# Patient Record
Sex: FEMALE | Race: WHITE | NOT HISPANIC OR LATINO | URBAN - METROPOLITAN AREA
[De-identification: names, ages, dates, MRNs, and addresses within clinical notes are randomized per-mention and may not be internally consistent; named-entity substitution may affect disease eponyms.]

---

## 2018-07-30 ENCOUNTER — ON CAMPUS - OUTPATIENT (AMBULATORY)
Dept: URBAN - METROPOLITAN AREA HOSPITAL 2 | Facility: HOSPITAL | Age: 78
End: 2018-07-30
Payer: MEDICARE

## 2018-07-30 VITALS
DIASTOLIC BLOOD PRESSURE: 70 MMHG | DIASTOLIC BLOOD PRESSURE: 68 MMHG | RESPIRATION RATE: 16 BRPM | WEIGHT: 168 LBS | OXYGEN SATURATION: 95 % | HEART RATE: 66 BPM | SYSTOLIC BLOOD PRESSURE: 152 MMHG | HEART RATE: 72 BPM | SYSTOLIC BLOOD PRESSURE: 118 MMHG | DIASTOLIC BLOOD PRESSURE: 91 MMHG | DIASTOLIC BLOOD PRESSURE: 64 MMHG | HEART RATE: 75 BPM | DIASTOLIC BLOOD PRESSURE: 106 MMHG | TEMPERATURE: 97.8 F | SYSTOLIC BLOOD PRESSURE: 117 MMHG | SYSTOLIC BLOOD PRESSURE: 158 MMHG | HEART RATE: 60 BPM | HEART RATE: 63 BPM | DIASTOLIC BLOOD PRESSURE: 72 MMHG | HEIGHT: 63 IN | OXYGEN SATURATION: 98 % | RESPIRATION RATE: 15 BRPM | SYSTOLIC BLOOD PRESSURE: 137 MMHG | OXYGEN SATURATION: 97 % | SYSTOLIC BLOOD PRESSURE: 115 MMHG | SYSTOLIC BLOOD PRESSURE: 130 MMHG | DIASTOLIC BLOOD PRESSURE: 74 MMHG | SYSTOLIC BLOOD PRESSURE: 161 MMHG | HEART RATE: 64 BPM | SYSTOLIC BLOOD PRESSURE: 129 MMHG | HEART RATE: 65 BPM | OXYGEN SATURATION: 96 % | DIASTOLIC BLOOD PRESSURE: 58 MMHG

## 2018-07-30 DIAGNOSIS — Z98.890 OTHER SPECIFIED POSTPROCEDURAL STATES: ICD-10-CM

## 2018-07-30 DIAGNOSIS — K57.30 DIVERTICULOSIS OF LARGE INTESTINE WITHOUT PERFORATION OR ABS: ICD-10-CM

## 2018-07-30 DIAGNOSIS — Z86.010 PERSONAL HISTORY OF COLONIC POLYPS: ICD-10-CM

## 2018-07-30 DIAGNOSIS — Z85.038 PERSONAL HISTORY OF OTHER MALIGNANT NEOPLASM OF LARGE INTEST: ICD-10-CM

## 2018-07-30 PROCEDURE — G0105 COLORECTAL SCRN; HI RISK IND: HCPCS | Performed by: INTERNAL MEDICINE

## 2018-07-30 RX ADMIN — PROPOFOL: 10 INJECTION, EMULSION INTRAVENOUS at 07:24

## 2023-10-10 ENCOUNTER — LAB (OUTPATIENT)
Dept: LAB | Facility: HOSPITAL | Age: 83
End: 2023-10-10
Payer: MEDICARE

## 2023-10-10 ENCOUNTER — TRANSCRIBE ORDERS (OUTPATIENT)
Dept: ADMINISTRATIVE | Facility: HOSPITAL | Age: 83
End: 2023-10-10
Payer: MEDICARE

## 2023-10-10 DIAGNOSIS — L29.8 PRURITUS SENILIS: ICD-10-CM

## 2023-10-10 DIAGNOSIS — L50.1 IDIOPATHIC URTICARIA: ICD-10-CM

## 2023-10-10 DIAGNOSIS — L30.9 ACUTE DERMATITIS: Primary | ICD-10-CM

## 2023-10-10 DIAGNOSIS — T78.3XXA GIANT URTICARIA, INITIAL ENCOUNTER: ICD-10-CM

## 2023-10-10 DIAGNOSIS — L30.9 ACUTE DERMATITIS: ICD-10-CM

## 2023-10-10 DIAGNOSIS — L24.0 IRRITANT CONTACT DERMATITIS DUE TO DETERGENT: ICD-10-CM

## 2023-10-10 LAB
ALBUMIN SERPL-MCNC: 4.1 G/DL (ref 3.5–5.2)
ALBUMIN/GLOB SERPL: 1.4 G/DL
ALP SERPL-CCNC: 83 U/L (ref 39–117)
ALT SERPL W P-5'-P-CCNC: 11 U/L (ref 1–33)
ANION GAP SERPL CALCULATED.3IONS-SCNC: 11.3 MMOL/L (ref 5–15)
AST SERPL-CCNC: 15 U/L (ref 1–32)
BASOPHILS # BLD AUTO: 0.09 10*3/MM3 (ref 0–0.2)
BASOPHILS NFR BLD AUTO: 0.9 % (ref 0–1.5)
BILIRUB SERPL-MCNC: 1 MG/DL (ref 0–1.2)
BUN SERPL-MCNC: 16 MG/DL (ref 8–23)
BUN/CREAT SERPL: 16.3 (ref 7–25)
C3 SERPL-MCNC: 131 MG/DL (ref 82–167)
C4 SERPL-MCNC: 22 MG/DL (ref 14–44)
CALCIUM SPEC-SCNC: 9.6 MG/DL (ref 8.6–10.5)
CHLORIDE SERPL-SCNC: 106 MMOL/L (ref 98–107)
CO2 SERPL-SCNC: 25.7 MMOL/L (ref 22–29)
CREAT SERPL-MCNC: 0.98 MG/DL (ref 0.57–1)
DEPRECATED RDW RBC AUTO: 39.1 FL (ref 37–54)
EGFRCR SERPLBLD CKD-EPI 2021: 57 ML/MIN/1.73
EOSINOPHIL # BLD AUTO: 0.1 10*3/MM3 (ref 0–0.4)
EOSINOPHIL NFR BLD AUTO: 1.1 % (ref 0.3–6.2)
ERYTHROCYTE [DISTWIDTH] IN BLOOD BY AUTOMATED COUNT: 12.5 % (ref 12.3–15.4)
GLOBULIN UR ELPH-MCNC: 3 GM/DL
GLUCOSE SERPL-MCNC: 93 MG/DL (ref 65–99)
HCT VFR BLD AUTO: 42.7 % (ref 34–46.6)
HGB BLD-MCNC: 14.4 G/DL (ref 12–15.9)
IMM GRANULOCYTES # BLD AUTO: 0.03 10*3/MM3 (ref 0–0.05)
IMM GRANULOCYTES NFR BLD AUTO: 0.3 % (ref 0–0.5)
LYMPHOCYTES # BLD AUTO: 2.15 10*3/MM3 (ref 0.7–3.1)
LYMPHOCYTES NFR BLD AUTO: 22.7 % (ref 19.6–45.3)
MCH RBC QN AUTO: 29.1 PG (ref 26.6–33)
MCHC RBC AUTO-ENTMCNC: 33.7 G/DL (ref 31.5–35.7)
MCV RBC AUTO: 86.4 FL (ref 79–97)
MONOCYTES # BLD AUTO: 0.76 10*3/MM3 (ref 0.1–0.9)
MONOCYTES NFR BLD AUTO: 8 % (ref 5–12)
NEUTROPHILS NFR BLD AUTO: 6.36 10*3/MM3 (ref 1.7–7)
NEUTROPHILS NFR BLD AUTO: 67 % (ref 42.7–76)
NRBC BLD AUTO-RTO: 0 /100 WBC (ref 0–0.2)
PLATELET # BLD AUTO: 249 10*3/MM3 (ref 140–450)
PMV BLD AUTO: 12 FL (ref 6–12)
POTASSIUM SERPL-SCNC: 4.3 MMOL/L (ref 3.5–5.2)
PROT SERPL-MCNC: 7.1 G/DL (ref 6–8.5)
RBC # BLD AUTO: 4.94 10*6/MM3 (ref 3.77–5.28)
SODIUM SERPL-SCNC: 143 MMOL/L (ref 136–145)
T4 FREE SERPL-MCNC: 1.36 NG/DL (ref 0.93–1.7)
TSH SERPL DL<=0.05 MIU/L-ACNC: 1.58 UIU/ML (ref 0.27–4.2)
WBC NRBC COR # BLD: 9.49 10*3/MM3 (ref 3.4–10.8)

## 2023-10-10 PROCEDURE — 86160 COMPLEMENT ANTIGEN: CPT

## 2023-10-10 PROCEDURE — 85025 COMPLETE CBC W/AUTO DIFF WBC: CPT

## 2023-10-10 PROCEDURE — 86038 ANTINUCLEAR ANTIBODIES: CPT

## 2023-10-10 PROCEDURE — 83520 IMMUNOASSAY QUANT NOS NONAB: CPT

## 2023-10-10 PROCEDURE — 84443 ASSAY THYROID STIM HORMONE: CPT

## 2023-10-10 PROCEDURE — 84165 PROTEIN E-PHORESIS SERUM: CPT

## 2023-10-10 PROCEDURE — 80053 COMPREHEN METABOLIC PANEL: CPT

## 2023-10-10 PROCEDURE — 84166 PROTEIN E-PHORESIS/URINE/CSF: CPT

## 2023-10-10 PROCEDURE — 84439 ASSAY OF FREE THYROXINE: CPT

## 2023-10-10 PROCEDURE — 83516 IMMUNOASSAY NONANTIBODY: CPT

## 2023-10-10 PROCEDURE — 84156 ASSAY OF PROTEIN URINE: CPT

## 2023-10-10 PROCEDURE — 36415 COLL VENOUS BLD VENIPUNCTURE: CPT

## 2023-10-11 LAB
ALBUMIN SERPL ELPH-MCNC: 3.7 G/DL (ref 2.9–4.4)
ALBUMIN/GLOB SERPL: 1.2 {RATIO} (ref 0.7–1.7)
ALPHA1 GLOB SERPL ELPH-MCNC: 0.2 G/DL (ref 0–0.4)
ALPHA2 GLOB SERPL ELPH-MCNC: 0.8 G/DL (ref 0.4–1)
ANA SER QL: NEGATIVE
B-GLOBULIN SERPL ELPH-MCNC: 1 G/DL (ref 0.7–1.3)
GAMMA GLOB SERPL ELPH-MCNC: 1 G/DL (ref 0.4–1.8)
GLOBULIN SER CALC-MCNC: 3.1 G/DL (ref 2.2–3.9)
LABORATORY COMMENT REPORT: NORMAL
M PROTEIN SERPL ELPH-MCNC: NORMAL G/DL
PROT SERPL-MCNC: 6.8 G/DL (ref 6–8.5)

## 2023-10-12 LAB
ALBUMIN MFR UR ELPH: 32.8 %
ALPHA1 GLOB MFR UR ELPH: 4.1 %
ALPHA2 GLOB MFR UR ELPH: 16 %
B-GLOBULIN MFR UR ELPH: 31.9 %
GAMMA GLOB MFR UR ELPH: 15.2 %
LABORATORY COMMENT REPORT: NORMAL
M PROTEIN MFR UR ELPH: NORMAL %
MYELOPEROXIDASE AB SER IA-ACNC: <0.2 UNITS (ref 0–0.9)
PROT UR-MCNC: 13.9 MG/DL
PROTEINASE3 AB SER IA-ACNC: <0.2 UNITS (ref 0–0.9)

## 2023-10-15 LAB — TRYPTASE SERPL-MCNC: 9.5 UG/L (ref 2.2–13.2)

## 2025-06-18 ENCOUNTER — HOSPITAL ENCOUNTER (OUTPATIENT)
Facility: HOSPITAL | Age: 85
Setting detail: OBSERVATION
Discharge: HOME | End: 2025-06-19
Attending: EMERGENCY MEDICINE | Admitting: INTERNAL MEDICINE
Payer: MEDICARE

## 2025-06-18 ENCOUNTER — APPOINTMENT (OUTPATIENT)
Dept: CT IMAGING | Facility: HOSPITAL | Age: 85
End: 2025-06-18
Payer: MEDICARE

## 2025-06-18 ENCOUNTER — APPOINTMENT (OUTPATIENT)
Dept: GENERAL RADIOLOGY | Facility: HOSPITAL | Age: 85
End: 2025-06-18
Payer: MEDICARE

## 2025-06-18 ENCOUNTER — APPOINTMENT (OUTPATIENT)
Dept: MRI IMAGING | Facility: HOSPITAL | Age: 85
End: 2025-06-18
Payer: MEDICARE

## 2025-06-18 ENCOUNTER — APPOINTMENT (OUTPATIENT)
Dept: CARDIOLOGY | Facility: HOSPITAL | Age: 85
End: 2025-06-18
Payer: MEDICARE

## 2025-06-18 DIAGNOSIS — I63.9 ACUTE CVA (CEREBROVASCULAR ACCIDENT): Primary | ICD-10-CM

## 2025-06-18 DIAGNOSIS — G45.9 TIA (TRANSIENT ISCHEMIC ATTACK): ICD-10-CM

## 2025-06-18 PROBLEM — G25.81 RLS (RESTLESS LEGS SYNDROME): Status: ACTIVE | Noted: 2025-06-18

## 2025-06-18 PROBLEM — Z85.038 HISTORY OF COLON CANCER: Status: ACTIVE | Noted: 2025-06-18

## 2025-06-18 PROBLEM — R53.1 ACUTE LEFT-SIDED WEAKNESS: Status: ACTIVE | Noted: 2025-06-18

## 2025-06-18 PROBLEM — F41.9 ANXIETY: Status: ACTIVE | Noted: 2025-06-18

## 2025-06-18 PROBLEM — R47.81 SLURRED SPEECH: Status: ACTIVE | Noted: 2025-06-18

## 2025-06-18 PROBLEM — R29.818 NEUROLOGIC ABNORMALITY: Status: ACTIVE | Noted: 2025-06-18

## 2025-06-18 LAB
ALBUMIN SERPL-MCNC: 4 G/DL (ref 3.5–5.2)
ALBUMIN/GLOB SERPL: 1.5 G/DL
ALP SERPL-CCNC: 89 U/L (ref 39–117)
ALT SERPL W P-5'-P-CCNC: 12 U/L (ref 1–33)
ANION GAP SERPL CALCULATED.3IONS-SCNC: 8.8 MMOL/L (ref 5–15)
ANION GAP SERPL CALCULATED.3IONS-SCNC: 9.4 MMOL/L (ref 5–15)
AST SERPL-CCNC: 21 U/L (ref 1–32)
BACTERIA UR QL AUTO: ABNORMAL /HPF
BASOPHILS # BLD AUTO: 0.05 10*3/MM3 (ref 0–0.2)
BASOPHILS NFR BLD AUTO: 0.6 % (ref 0–1.5)
BH CV XLRA MEAS LEFT CAROTID BULB EDV: 12.4 CM/SEC
BH CV XLRA MEAS LEFT CAROTID BULB PSV: 62.1 CM/SEC
BH CV XLRA MEAS LEFT DIST CCA EDV: 14.7 CM/SEC
BH CV XLRA MEAS LEFT DIST CCA PSV: 69.4 CM/SEC
BH CV XLRA MEAS LEFT DIST ICA EDV: -12.1 CM/SEC
BH CV XLRA MEAS LEFT DIST ICA PSV: -51.9 CM/SEC
BH CV XLRA MEAS LEFT ICA/CCA RATIO: -0.53
BH CV XLRA MEAS LEFT PROX CCA EDV: 18.4 CM/SEC
BH CV XLRA MEAS LEFT PROX CCA PSV: 126 CM/SEC
BH CV XLRA MEAS LEFT PROX ECA PSV: -96.9 CM/SEC
BH CV XLRA MEAS LEFT PROX ICA EDV: -16.8 CM/SEC
BH CV XLRA MEAS LEFT PROX ICA PSV: -67.1 CM/SEC
BH CV XLRA MEAS LEFT PROX SCLA PSV: 104 CM/SEC
BH CV XLRA MEAS LEFT VERTEBRAL A EDV: -7.7 CM/SEC
BH CV XLRA MEAS LEFT VERTEBRAL A PSV: -32.6 CM/SEC
BH CV XLRA MEAS RIGHT CAROTID BULB EDV: 14.9 CM/SEC
BH CV XLRA MEAS RIGHT CAROTID BULB PSV: 73.3 CM/SEC
BH CV XLRA MEAS RIGHT DIST CCA EDV: 13.7 CM/SEC
BH CV XLRA MEAS RIGHT DIST CCA PSV: 73.3 CM/SEC
BH CV XLRA MEAS RIGHT DIST ICA EDV: -10.6 CM/SEC
BH CV XLRA MEAS RIGHT DIST ICA PSV: -48.1 CM/SEC
BH CV XLRA MEAS RIGHT ICA/CCA RATIO: -0.7
BH CV XLRA MEAS RIGHT PROX CCA EDV: 12.4 CM/SEC
BH CV XLRA MEAS RIGHT PROX CCA PSV: 82 CM/SEC
BH CV XLRA MEAS RIGHT PROX ECA PSV: -95.1 CM/SEC
BH CV XLRA MEAS RIGHT PROX ICA EDV: -19.9 CM/SEC
BH CV XLRA MEAS RIGHT PROX ICA PSV: -57.8 CM/SEC
BH CV XLRA MEAS RIGHT PROX SCLA PSV: 167 CM/SEC
BH CV XLRA MEAS RIGHT VERTEBRAL A EDV: -9.8 CM/SEC
BH CV XLRA MEAS RIGHT VERTEBRAL A PSV: -38.3 CM/SEC
BILIRUB SERPL-MCNC: 0.7 MG/DL (ref 0–1.2)
BILIRUB UR QL STRIP: NEGATIVE
BUN SERPL-MCNC: 16 MG/DL (ref 8–23)
BUN SERPL-MCNC: 18.5 MG/DL (ref 8–23)
BUN/CREAT SERPL: 15.9 (ref 7–25)
BUN/CREAT SERPL: 17.4 (ref 7–25)
CALCIUM SPEC-SCNC: 8.6 MG/DL (ref 8.6–10.5)
CALCIUM SPEC-SCNC: 9.2 MG/DL (ref 8.6–10.5)
CHLORIDE SERPL-SCNC: 107 MMOL/L (ref 98–107)
CHLORIDE SERPL-SCNC: 108 MMOL/L (ref 98–107)
CHOLEST SERPL-MCNC: 165 MG/DL (ref 0–200)
CLARITY UR: CLEAR
CO2 SERPL-SCNC: 23.2 MMOL/L (ref 22–29)
CO2 SERPL-SCNC: 25.6 MMOL/L (ref 22–29)
COLOR UR: YELLOW
CREAT SERPL-MCNC: 0.92 MG/DL (ref 0.57–1)
CREAT SERPL-MCNC: 1.16 MG/DL (ref 0.57–1)
DEPRECATED RDW RBC AUTO: 44 FL (ref 37–54)
EGFRCR SERPLBLD CKD-EPI 2021: 46.3 ML/MIN/1.73
EGFRCR SERPLBLD CKD-EPI 2021: 61.1 ML/MIN/1.73
EOSINOPHIL # BLD AUTO: 0.12 10*3/MM3 (ref 0–0.4)
EOSINOPHIL NFR BLD AUTO: 1.4 % (ref 0.3–6.2)
ERYTHROCYTE [DISTWIDTH] IN BLOOD BY AUTOMATED COUNT: 13.2 % (ref 12.3–15.4)
GLOBULIN UR ELPH-MCNC: 2.7 GM/DL
GLUCOSE SERPL-MCNC: 94 MG/DL (ref 65–99)
GLUCOSE SERPL-MCNC: 95 MG/DL (ref 65–99)
GLUCOSE UR STRIP-MCNC: NEGATIVE MG/DL
HBA1C MFR BLD: 5.19 % (ref 4.8–5.6)
HCT VFR BLD AUTO: 42.1 % (ref 34–46.6)
HDLC SERPL-MCNC: 44 MG/DL (ref 40–60)
HGB BLD-MCNC: 13.5 G/DL (ref 12–15.9)
HGB UR QL STRIP.AUTO: NEGATIVE
HOLD SPECIMEN: NORMAL
HOLD SPECIMEN: NORMAL
HYALINE CASTS UR QL AUTO: ABNORMAL /LPF
IMM GRANULOCYTES # BLD AUTO: 0.03 10*3/MM3 (ref 0–0.05)
IMM GRANULOCYTES NFR BLD AUTO: 0.4 % (ref 0–0.5)
INR PPP: 1.1 (ref 0.9–1.1)
KETONES UR QL STRIP: NEGATIVE
LDLC SERPL CALC-MCNC: 103 MG/DL (ref 0–100)
LDLC/HDLC SERPL: 2.3 {RATIO}
LEUKOCYTE ESTERASE UR QL STRIP.AUTO: ABNORMAL
LYMPHOCYTES # BLD AUTO: 1.94 10*3/MM3 (ref 0.7–3.1)
LYMPHOCYTES NFR BLD AUTO: 22.7 % (ref 19.6–45.3)
MAGNESIUM SERPL-MCNC: 2.3 MG/DL (ref 1.6–2.4)
MCH RBC QN AUTO: 29.2 PG (ref 26.6–33)
MCHC RBC AUTO-ENTMCNC: 32.1 G/DL (ref 31.5–35.7)
MCV RBC AUTO: 90.9 FL (ref 79–97)
MONOCYTES # BLD AUTO: 0.76 10*3/MM3 (ref 0.1–0.9)
MONOCYTES NFR BLD AUTO: 8.9 % (ref 5–12)
NEUTROPHILS NFR BLD AUTO: 5.66 10*3/MM3 (ref 1.7–7)
NEUTROPHILS NFR BLD AUTO: 66 % (ref 42.7–76)
NITRITE UR QL STRIP: NEGATIVE
NRBC BLD AUTO-RTO: 0 /100 WBC (ref 0–0.2)
PH UR STRIP.AUTO: 7.5 [PH] (ref 5–8)
PLATELET # BLD AUTO: 253 10*3/MM3 (ref 140–450)
PMV BLD AUTO: 10.3 FL (ref 6–12)
POTASSIUM SERPL-SCNC: 4.3 MMOL/L (ref 3.5–5.2)
POTASSIUM SERPL-SCNC: 4.4 MMOL/L (ref 3.5–5.2)
PROT SERPL-MCNC: 6.7 G/DL (ref 6–8.5)
PROT UR QL STRIP: NEGATIVE
PROTHROMBIN TIME: 14.2 SECONDS (ref 11.7–14.2)
RBC # BLD AUTO: 4.63 10*6/MM3 (ref 3.77–5.28)
RBC # UR STRIP: ABNORMAL /HPF
REF LAB TEST METHOD: ABNORMAL
SODIUM SERPL-SCNC: 140 MMOL/L (ref 136–145)
SODIUM SERPL-SCNC: 142 MMOL/L (ref 136–145)
SP GR UR STRIP: 1.01 (ref 1–1.03)
SQUAMOUS #/AREA URNS HPF: ABNORMAL /HPF
T4 FREE SERPL-MCNC: 1.1 NG/DL (ref 0.92–1.68)
TRIGL SERPL-MCNC: 98 MG/DL (ref 0–150)
TSH SERPL DL<=0.05 MIU/L-ACNC: 1.98 UIU/ML (ref 0.27–4.2)
UROBILINOGEN UR QL STRIP: ABNORMAL
VLDLC SERPL-MCNC: 18 MG/DL (ref 5–40)
WBC # UR STRIP: ABNORMAL /HPF
WBC NRBC COR # BLD AUTO: 8.56 10*3/MM3 (ref 3.4–10.8)
WHOLE BLOOD HOLD COAG: NORMAL
WHOLE BLOOD HOLD SPECIMEN: NORMAL

## 2025-06-18 PROCEDURE — 84439 ASSAY OF FREE THYROXINE: CPT

## 2025-06-18 PROCEDURE — G0378 HOSPITAL OBSERVATION PER HR: HCPCS

## 2025-06-18 PROCEDURE — 82607 VITAMIN B-12: CPT | Performed by: INTERNAL MEDICINE

## 2025-06-18 PROCEDURE — 84443 ASSAY THYROID STIM HORMONE: CPT

## 2025-06-18 PROCEDURE — 83735 ASSAY OF MAGNESIUM: CPT

## 2025-06-18 PROCEDURE — 93880 EXTRACRANIAL BILAT STUDY: CPT | Performed by: SURGERY

## 2025-06-18 PROCEDURE — 85025 COMPLETE CBC W/AUTO DIFF WBC: CPT | Performed by: EMERGENCY MEDICINE

## 2025-06-18 PROCEDURE — 80053 COMPREHEN METABOLIC PANEL: CPT | Performed by: EMERGENCY MEDICINE

## 2025-06-18 PROCEDURE — 83036 HEMOGLOBIN GLYCOSYLATED A1C: CPT

## 2025-06-18 PROCEDURE — 70450 CT HEAD/BRAIN W/O DYE: CPT

## 2025-06-18 PROCEDURE — 81001 URINALYSIS AUTO W/SCOPE: CPT

## 2025-06-18 PROCEDURE — 99285 EMERGENCY DEPT VISIT HI MDM: CPT

## 2025-06-18 PROCEDURE — 93005 ELECTROCARDIOGRAM TRACING: CPT | Performed by: EMERGENCY MEDICINE

## 2025-06-18 PROCEDURE — 70551 MRI BRAIN STEM W/O DYE: CPT

## 2025-06-18 PROCEDURE — 93880 EXTRACRANIAL BILAT STUDY: CPT

## 2025-06-18 PROCEDURE — 71045 X-RAY EXAM CHEST 1 VIEW: CPT

## 2025-06-18 PROCEDURE — 85610 PROTHROMBIN TIME: CPT | Performed by: EMERGENCY MEDICINE

## 2025-06-18 PROCEDURE — 87086 URINE CULTURE/COLONY COUNT: CPT

## 2025-06-18 PROCEDURE — 80061 LIPID PANEL: CPT

## 2025-06-18 RX ORDER — ACETAMINOPHEN 650 MG/1
650 SUPPOSITORY RECTAL EVERY 4 HOURS PRN
Status: DISCONTINUED | OUTPATIENT
Start: 2025-06-18 | End: 2025-06-19 | Stop reason: HOSPADM

## 2025-06-18 RX ORDER — HYDRALAZINE HYDROCHLORIDE 20 MG/ML
10 INJECTION INTRAMUSCULAR; INTRAVENOUS EVERY 6 HOURS PRN
Status: DISCONTINUED | OUTPATIENT
Start: 2025-06-18 | End: 2025-06-19 | Stop reason: HOSPADM

## 2025-06-18 RX ORDER — ONDANSETRON 2 MG/ML
4 INJECTION INTRAMUSCULAR; INTRAVENOUS EVERY 6 HOURS PRN
Status: DISCONTINUED | OUTPATIENT
Start: 2025-06-18 | End: 2025-06-19 | Stop reason: HOSPADM

## 2025-06-18 RX ORDER — SODIUM CHLORIDE 9 MG/ML
40 INJECTION, SOLUTION INTRAVENOUS AS NEEDED
Status: DISCONTINUED | OUTPATIENT
Start: 2025-06-18 | End: 2025-06-19 | Stop reason: HOSPADM

## 2025-06-18 RX ORDER — ACETAMINOPHEN 325 MG/1
650 TABLET ORAL EVERY 4 HOURS PRN
Status: DISCONTINUED | OUTPATIENT
Start: 2025-06-18 | End: 2025-06-19 | Stop reason: HOSPADM

## 2025-06-18 RX ORDER — SODIUM CHLORIDE 0.9 % (FLUSH) 0.9 %
10 SYRINGE (ML) INJECTION EVERY 12 HOURS SCHEDULED
Status: DISCONTINUED | OUTPATIENT
Start: 2025-06-18 | End: 2025-06-19 | Stop reason: HOSPADM

## 2025-06-18 RX ORDER — SODIUM CHLORIDE 0.9 % (FLUSH) 0.9 %
10 SYRINGE (ML) INJECTION AS NEEDED
Status: DISCONTINUED | OUTPATIENT
Start: 2025-06-18 | End: 2025-06-19 | Stop reason: HOSPADM

## 2025-06-18 RX ORDER — HYDROXYZINE HYDROCHLORIDE 25 MG/1
25 TABLET, FILM COATED ORAL 3 TIMES DAILY PRN
Status: DISCONTINUED | OUTPATIENT
Start: 2025-06-18 | End: 2025-06-19

## 2025-06-18 RX ORDER — ALPRAZOLAM 0.5 MG
0.5 TABLET ORAL AS NEEDED
COMMUNITY
End: 2025-06-19 | Stop reason: HOSPADM

## 2025-06-18 RX ORDER — ASPIRIN 81 MG/1
324 TABLET, CHEWABLE ORAL ONCE
Status: COMPLETED | OUTPATIENT
Start: 2025-06-18 | End: 2025-06-18

## 2025-06-18 RX ORDER — GABAPENTIN 100 MG/1
100 CAPSULE ORAL 2 TIMES DAILY
COMMUNITY

## 2025-06-18 RX ORDER — ACETAMINOPHEN 160 MG/5ML
650 SOLUTION ORAL EVERY 4 HOURS PRN
Status: DISCONTINUED | OUTPATIENT
Start: 2025-06-18 | End: 2025-06-19 | Stop reason: HOSPADM

## 2025-06-18 RX ORDER — GABAPENTIN 100 MG/1
100 CAPSULE ORAL 2 TIMES DAILY
Status: DISCONTINUED | OUTPATIENT
Start: 2025-06-18 | End: 2025-06-19 | Stop reason: HOSPADM

## 2025-06-18 RX ORDER — PANTOPRAZOLE SODIUM 40 MG/1
40 TABLET, DELAYED RELEASE ORAL
Status: DISCONTINUED | OUTPATIENT
Start: 2025-06-19 | End: 2025-06-19 | Stop reason: HOSPADM

## 2025-06-18 RX ADMIN — HYDROXYZINE HYDROCHLORIDE 25 MG: 25 TABLET, FILM COATED ORAL at 18:18

## 2025-06-18 RX ADMIN — GABAPENTIN 100 MG: 100 CAPSULE ORAL at 20:20

## 2025-06-18 RX ADMIN — Medication 10 ML: at 20:22

## 2025-06-18 RX ADMIN — ASPIRIN 81 MG CHEWABLE TABLET 324 MG: 81 TABLET CHEWABLE at 14:20

## 2025-06-18 NOTE — Clinical Note
Level of Care: Telemetry [5]   Admitting Physician: LACHO FIGUEROA [[id number]]   Attending Physician: LACHO FIGUEROA [[id number]]   Bed Request Comments: thao

## 2025-06-18 NOTE — ED PROVIDER NOTES
Subjective   History of Present Illness  Chief complaint slurred speech dragging left leg    History of present illness 84-year-old female complains of slurred speech and dragging left leg starting last Tuesday which was a week ago.  She gets this, intermittent shaking as well.  This occurs over her whole body.  There is no headache or visual changes.  She has been somewhat dizzy and hanging onto things when she tries to walk.  There is no chest pain neck arm jaw pain or shortness of breath no recent injury illness flus viruses vaccinations foreign travels antibiotic use or hospitalization.  She states that this started last Tuesday when she was at her sister's .  Nothing otherwise seems to make it better or worse and there is no pain.      Review of Systems   Constitutional:  Negative for chills and fever.   Respiratory:  Negative for chest tightness and shortness of breath.    Cardiovascular:  Negative for chest pain.   Gastrointestinal:  Negative for abdominal pain and vomiting.   Genitourinary:  Negative for difficulty urinating and dysuria.   Musculoskeletal:  Negative for neck pain.   Skin:  Negative for rash.   Neurological:  Positive for speech difficulty and weakness.   Psychiatric/Behavioral:  Negative for confusion.        No past medical history on file.    Allergies   Allergen Reactions    Ace Inhibitors Hives     ha    Amlodipine Swelling    Amlodipine Besylate Swelling    Methylprednisolone Hives    Propoxyphene Swelling    Statins Nausea And Vomiting and Other (See Comments)     nervous    Sulfa Antibiotics Hives     Age 17       No past surgical history on file.    No family history on file.    Social History     Socioeconomic History    Marital status:    No tobacco alcohol or drug    Prior to Admission medications    Medication Sig Start Date End Date Taking? Authorizing Provider   ALPRAZolam (XANAX) 0.5 MG tablet Take 1 tablet by mouth As Needed for Anxiety.    Provider, Historical,  MD   gabapentin (NEURONTIN) 100 MG capsule Take 1 capsule by mouth 2 (Two) Times a Day.    Provider, Mercedes, MD        Objective   Physical Exam  Constitutional this is an 84-year-old awake alert in no acute distress triage vital signs reviewed.  HEENT extraocular muscles are intact pupils equal round react there is no photophobia or papilledema the mouth is clear.  Neck supple no adenopathy no JVD no bruits no meningeal signs lungs were clear no retraction no use of accessories.  Heart regular without murmur rub abdomen was soft nontender good bowel sounds no peritoneal findings or pulsatile masses extremities pulses equal upper and lower extremities no edema no cords no Homans' sign no evidence of DVT skin is warm and dry without rashes or cellulitic changes neurologic awake alert orientated x 3 isgood symmetry speech seems okay currently to me.  There is no drift in the arms she does have a slight drift in the left leg.  Normal finger-to-nose normal heel shin no clonus is noted no extinction peripheral vision is intact she is able to identify objects and read  without difficulty.  NIH of 1 currently  Procedures           ED Course      Results for orders placed or performed during the hospital encounter of 06/18/25   ECG 12 Lead ED Triage Standing Order; Stroke (Onset >12 hrs)    Collection Time: 06/18/25 12:06 PM   Result Value Ref Range    QT Interval 406 ms    QTC Interval 434 ms   Comprehensive Metabolic Panel    Collection Time: 06/18/25 12:21 PM    Specimen: Blood   Result Value Ref Range    Glucose 94 65 - 99 mg/dL    BUN 16.0 8.0 - 23.0 mg/dL    Creatinine 0.92 0.57 - 1.00 mg/dL    Sodium 142 136 - 145 mmol/L    Potassium 4.4 3.5 - 5.2 mmol/L    Chloride 107 98 - 107 mmol/L    CO2 25.6 22.0 - 29.0 mmol/L    Calcium 9.2 8.6 - 10.5 mg/dL    Total Protein 6.7 6.0 - 8.5 g/dL    Albumin 4.0 3.5 - 5.2 g/dL    ALT (SGPT) 12 1 - 33 U/L    AST (SGOT) 21 1 - 32 U/L    Alkaline Phosphatase 89 39 - 117 U/L     Total Bilirubin 0.7 0.0 - 1.2 mg/dL    Globulin 2.7 gm/dL    A/G Ratio 1.5 g/dL    BUN/Creatinine Ratio 17.4 7.0 - 25.0    Anion Gap 9.4 5.0 - 15.0 mmol/L    eGFR 61.1 >60.0 mL/min/1.73   Protime-INR    Collection Time: 06/18/25 12:21 PM    Specimen: Blood   Result Value Ref Range    Protime 14.2 11.7 - 14.2 Seconds    INR 1.10 0.90 - 1.10   CBC Auto Differential    Collection Time: 06/18/25 12:21 PM    Specimen: Blood   Result Value Ref Range    WBC 8.56 3.40 - 10.80 10*3/mm3    RBC 4.63 3.77 - 5.28 10*6/mm3    Hemoglobin 13.5 12.0 - 15.9 g/dL    Hematocrit 42.1 34.0 - 46.6 %    MCV 90.9 79.0 - 97.0 fL    MCH 29.2 26.6 - 33.0 pg    MCHC 32.1 31.5 - 35.7 g/dL    RDW 13.2 12.3 - 15.4 %    RDW-SD 44.0 37.0 - 54.0 fl    MPV 10.3 6.0 - 12.0 fL    Platelets 253 140 - 450 10*3/mm3    Neutrophil % 66.0 42.7 - 76.0 %    Lymphocyte % 22.7 19.6 - 45.3 %    Monocyte % 8.9 5.0 - 12.0 %    Eosinophil % 1.4 0.3 - 6.2 %    Basophil % 0.6 0.0 - 1.5 %    Immature Grans % 0.4 0.0 - 0.5 %    Neutrophils, Absolute 5.66 1.70 - 7.00 10*3/mm3    Lymphocytes, Absolute 1.94 0.70 - 3.10 10*3/mm3    Monocytes, Absolute 0.76 0.10 - 0.90 10*3/mm3    Eosinophils, Absolute 0.12 0.00 - 0.40 10*3/mm3    Basophils, Absolute 0.05 0.00 - 0.20 10*3/mm3    Immature Grans, Absolute 0.03 0.00 - 0.05 10*3/mm3    nRBC 0.0 0.0 - 0.2 /100 WBC   Green Top (Gel)    Collection Time: 06/18/25 12:21 PM   Result Value Ref Range    Extra Tube Hold for add-ons.    Lavender Top    Collection Time: 06/18/25 12:21 PM   Result Value Ref Range    Extra Tube hold for add-on    Gold Top - SST    Collection Time: 06/18/25 12:21 PM   Result Value Ref Range    Extra Tube Hold for add-ons.    Light Blue Top    Collection Time: 06/18/25 12:21 PM   Result Value Ref Range    Extra Tube Hold for add-ons.      XR Chest 1 View  Result Date: 6/18/2025  Impression: No active disease. Electronically Signed: Justin Alanis MD  6/18/2025 1:05 PM EDT  Workstation ID: [id number]    CT Head  Without Contrast Stroke Protocol  Result Date: 6/18/2025  Impression: 1. No intracranial hemorrhage. 2. Mild white matter findings most suggestive of chronic microvascular disease. Electronically Signed: Bbo Saleh MD  6/18/2025 12:38 PM EDT  Workstation ID: [id number]    Medications   sodium chloride 0.9 % flush 10 mL (has no administration in time range)   aspirin chewable tablet 324 mg (has no administration in time range)       EKG my interpretation normal sinus rhythm rate of 68 normal axis no hypertrophy QTc of 434 normal EKG nothing old to compare                          Total (NIH Stroke Scale): 1                      Medical Decision Making  Medical Decision Making.  IV established monitor placed my review of sinus rhythm EKG my interpretation normal sinus rhythm at 68 normal axis hypertrophy QTc of 434 normal EKG with nothing old to compare with.  Chest x-ray my independent review no pneumonia pneumothorax failure acute findings radiology review unremarkable CT head without nondependent view I do not see any tumors masses hemorrhage or acute findings.  Radiology finding no acute findings.  Labs obtained my independent review comprehensive metabolic profile unremarkable coags and CBC unremarkable.  Patient's exam resting comfortably still with NIH of 1 she does get these periodic sort of generalized body tremors she is awake and conscious no generalized tonic-clonic seizure activity.  I do not see any evidence of acute cerebral hemorrhage or carotid tumor or dissection or meningitis encephalitis based on my history and physical and clinical findings although not a complete list of all possibilities.  I would suspect a stroke she will need further workup with MRI and CT angiograms and further working up.  I talked to patient about this.  And family made aware of the findings.  I talked to the nurse practitioner for Dr. Wagner indication to suggest the patient be admitted to hospital for further care stable  otherwise unremarkable course.  Patient is not any candidate for thrombolytic intervention as she is a week out from this.    Problems Addressed:  Acute CVA (cerebrovascular accident): complicated acute illness or injury    Amount and/or Complexity of Data Reviewed  Labs: ordered. Decision-making details documented in ED Course.  Radiology: ordered and independent interpretation performed. Decision-making details documented in ED Course.  ECG/medicine tests: ordered and independent interpretation performed. Decision-making details documented in ED Course.    Risk  Decision regarding hospitalization.        Final diagnoses:   Acute CVA (cerebrovascular accident)       ED Disposition  ED Disposition       ED Disposition   Decision to Admit    Condition   --    Comment   Level of Care: Telemetry [5]   Admitting Physician: LACHO FIGUEROA [[id number]]   Attending Physician: LACHO FIGUEROA [[id number]]   Bed Request Comments: thao                 No follow-up provider specified.       Medication List      No changes were made to your prescriptions during this visit.            Missael Fong MD  06/18/25 1400       Missael Fong MD  06/18/25 [id number]

## 2025-06-18 NOTE — H&P
Patient Care Team:  Provider, No Known as PCP - General    Chief complaint neurologic changes    Subjective     Pura Alexander is an 84-year-old female who presents to [facility] ED on 2025 after a week progression of neurologic changes.  Patient has a past medical history of colon cancer.  She overall takes no prescribed medication.  Patient reports she was at her sisters  when she developed severe head pain describing it as a band circling the top of her head.  She was having trouble getting her words out.  She had her daughters take her outside and sit in a car in front of the air conditioner.  Patient reports this head pain\pressure has been present for the past week coming and going.  She does report slurred speech but this has improved.  Patient also had significant left sided weakness which has also improved.  Does report dizziness that has been present for the past week as well.    Review of Systems   Constitutional:  Positive for activity change and appetite change.   HENT: Negative.     Respiratory: Negative.     Cardiovascular: Negative.    Gastrointestinal: Negative.    Genitourinary: Negative.    Neurological:  Positive for dizziness, speech difficulty, weakness and light-headedness.   Psychiatric/Behavioral: Negative.            History  No past medical history on file.  No past surgical history on file.  No family history on file.     (Not in a hospital admission)    Allergies:  Ace inhibitors, Amlodipine, Amlodipine besylate, Methylprednisolone, Propoxyphene, Statins, and Sulfa antibiotics    Objective     Vital Signs  Temp:  [97.9 °F (36.6 °C)] 97.9 °F (36.6 °C)  Heart Rate:  [65-72] 65  Resp:  [18] 18  BP: (161-194)/(67-75) 163/73       Physical Exam  Vitals reviewed.   HENT:      Head: Normocephalic and atraumatic.      Right Ear: External ear normal.      Left Ear: External ear normal.      Nose: Nose normal.   Eyes:      General:         Right eye: No discharge.          Left eye: No discharge.   Cardiovascular:      Rate and Rhythm: Normal rate.   Pulmonary:      Effort: Pulmonary effort is normal.   Abdominal:      General: Bowel sounds are normal.      Palpations: Abdomen is soft.   Skin:     General: Skin is warm and dry.      Comments: Left lower extremity weakness   Neurological:      Mental Status: She is alert and oriented to person, place, and time.   Psychiatric:         Behavior: Behavior normal.          Results Review:     Imaging Results (Last 24 Hours)       Procedure Component Value Units Date/Time    XR Chest 1 View [[id number]] Collected: 06/18/25 1300     Updated: 06/18/25 1307    Narrative:      XR CHEST 1 VW    Date of Exam: 6/18/2025 12:33 PM EDT    Indication: Acute cerebrovascular accident, speech difficulties.    Comparison: None available.    Findings:  The heart size is normal. The pulmonary vascular markings are normal. The lungs and pleural spaces are clear of active disease. There are chronic age-related changes involving the bony thorax and thoracic aorta.      Impression:      Impression:  No active disease.        Electronically Signed: Justin Alanis MD    6/18/2025 1:05 PM EDT    Workstation ID: [id number]    CT Head Without Contrast Stroke Protocol [[id number]] Collected: 06/18/25 1235     Updated: 06/18/25 1240    Narrative:      CT HEAD WO CONTRAST STROKE PROTOCOL    Date of Exam: 6/18/2025 12:20 PM EDT    Indication: Stroke, follow up    Comparison: None available.    Technique: Axial CT images were obtained of the head without contrast administration.  Reconstructed coronal and sagittal images were also obtained. Automated exposure control and iterative construction methods were used.    Exam was not a code stroke exam per the CT technologist.    Findings:  No intracranial hemorrhage. No mass effect or midline shift. There are few mild areas of periventricular and subcortical white matter hypoattenuation most suggestive of chronic microvascular  disease. The posterior fossa is without acute abnormality. No   intraventricular hemorrhage. Globes symmetric. No retro-orbital abnormality. The mastoid air cells are well-aerated. There is no sinus fluid level. No calvarial fracture.      Impression:      Impression:  1. No intracranial hemorrhage.  2. Mild white matter findings most suggestive of chronic microvascular disease.            Electronically Signed: Bob Saleh MD    6/18/2025 12:38 PM EDT    Workstation ID: [id number]             Lab Results (last 24 hours)       Procedure Component Value Units Date/Time    Comprehensive Metabolic Panel [[id number]] Collected: 06/18/25 1221    Specimen: Blood Updated: 06/18/25 1256     Glucose 94 mg/dL      BUN 16.0 mg/dL      Creatinine 0.92 mg/dL      Sodium 142 mmol/L      Potassium 4.4 mmol/L      Chloride 107 mmol/L      CO2 25.6 mmol/L      Calcium 9.2 mg/dL      Total Protein 6.7 g/dL      Albumin 4.0 g/dL      ALT (SGPT) 12 U/L      AST (SGOT) 21 U/L      Alkaline Phosphatase 89 U/L      Total Bilirubin 0.7 mg/dL      Globulin 2.7 gm/dL      A/G Ratio 1.5 g/dL      BUN/Creatinine Ratio 17.4     Anion Gap 9.4 mmol/L      eGFR 61.1 mL/min/1.73     Narrative:      GFR Categories in Chronic Kidney Disease (CKD)              GFR Category          GFR (mL/min/1.73)    Interpretation  G1                    90 or greater        Normal or high (1)  G2                    60-89                Mild decrease (1)  G3a                   45-59                Mild to moderate decrease  G3b                   30-44                Moderate to severe decrease  G4                    15-29                Severe decrease  G5                    14 or less           Kidney failure    (1)In the absence of evidence of kidney disease, neither GFR category G1 or G2 fulfill the criteria for CKD.    eGFR calculation 2021 CKD-EPI creatinine equation, which does not include race as a factor    Protime-INR [[id number]]  (Normal) Collected: 06/18/25  1221    Specimen: Blood Updated: 06/18/25 1237     Protime 14.2 Seconds      INR 1.10    [place] Draw [[id number]] Collected: 06/18/25 1221    Specimen: Blood Updated: 06/18/25 1231    Narrative:      The following orders were created for panel order [place] Draw.  Procedure                               Abnormality         Status                     ---------                               -----------         ------                     Green Top (Gel)[[id number]]                                  Final result               Lavender Top[[id number]]                                     Final result               Gold Top - SST[[id number]]                                   Final result               Light Blue Top[[id number]]                                   Final result                 Please view results for these tests on the individual orders.    Green Top (Gel) [[id number]] Collected: 06/18/25 1221    Specimen: Blood Updated: 06/18/25 1231     Extra Tube Hold for add-ons.     Comment: Auto resulted.       Lavender Top [[id number]] Collected: 06/18/25 1221    Specimen: Blood Updated: 06/18/25 1231     Extra Tube hold for add-on     Comment: Auto resulted       Gold Top - SST [[id number]] Collected: 06/18/25 1221    Specimen: Blood Updated: 06/18/25 1231     Extra Tube Hold for add-ons.     Comment: Auto resulted.       Light Blue Top [[id number]] Collected: 06/18/25 1221    Specimen: Blood Updated: 06/18/25 1231     Extra Tube Hold for add-ons.     Comment: Auto resulted       CBC & Differential [[id number]]  (Normal) Collected: 06/18/25 1221    Specimen: Blood Updated: 06/18/25 1227    Narrative:      The following orders were created for panel order CBC & Differential.  Procedure                               Abnormality         Status                     ---------                               -----------         ------                     CBC Auto Differential[[id number]]        Normal              Final result                  Please view results for these tests on the individual orders.    CBC Auto Differential [[id number]]  (Normal) Collected: 06/18/25 1221    Specimen: Blood Updated: 06/18/25 1227     WBC 8.56 10*3/mm3      RBC 4.63 10*6/mm3      Hemoglobin 13.5 g/dL      Hematocrit 42.1 %      MCV 90.9 fL      MCH 29.2 pg      MCHC 32.1 g/dL      RDW 13.2 %      RDW-SD 44.0 fl      MPV 10.3 fL      Platelets 253 10*3/mm3      Neutrophil % 66.0 %      Lymphocyte % 22.7 %      Monocyte % 8.9 %      Eosinophil % 1.4 %      Basophil % 0.6 %      Immature Grans % 0.4 %      Neutrophils, Absolute 5.66 10*3/mm3      Lymphocytes, Absolute 1.94 10*3/mm3      Monocytes, Absolute 0.76 10*3/mm3      Eosinophils, Absolute 0.12 10*3/mm3      Basophils, Absolute 0.05 10*3/mm3      Immature Grans, Absolute 0.03 10*3/mm3      nRBC 0.0 /100 WBC              I reviewed the patient's new clinical results.    Assessment & Plan       Slurred speech    Acute left-sided weakness    Anxiety    RLS (restless legs syndrome)    History of colon cancer    - Appreciate neurology input  - Will check UA, magnesium, TSH\Free T4, hemoglobin A1c, lipid panel  - CT head shows no acute intracranial change, will order MRI and bilateral carotid ultrasound  -BP control  - PT\OT  - Continue home medications for chronic conditions    Diet: N.p.o.  DVT prophylaxis: SCDs  GI prophylaxis: Protonix    CODE STATUS:  Code status (Patient has no pulse and is not breathing):   Medical Interventions (Patient has pulse or is breathing):  Level of Support Discussed with : Full code    Admission Status: Patient meets observation status    Expected length of Stay: 1-2 midnight stays      I discussed the patient's findings and my recommendations with patient.     CRISTINA Clark  06/18/25  15:04 EDT

## 2025-06-19 ENCOUNTER — APPOINTMENT (OUTPATIENT)
Dept: RESPIRATORY THERAPY | Facility: HOSPITAL | Age: 85
End: 2025-06-19
Payer: MEDICARE

## 2025-06-19 ENCOUNTER — APPOINTMENT (OUTPATIENT)
Dept: CT IMAGING | Facility: HOSPITAL | Age: 85
End: 2025-06-19
Payer: MEDICARE

## 2025-06-19 ENCOUNTER — APPOINTMENT (OUTPATIENT)
Dept: CARDIOLOGY | Facility: HOSPITAL | Age: 85
End: 2025-06-19
Payer: MEDICARE

## 2025-06-19 VITALS
HEIGHT: 63 IN | DIASTOLIC BLOOD PRESSURE: 72 MMHG | RESPIRATION RATE: 13 BRPM | WEIGHT: 159.39 LBS | SYSTOLIC BLOOD PRESSURE: 153 MMHG | TEMPERATURE: 98 F | OXYGEN SATURATION: 98 % | HEART RATE: 62 BPM | BODY MASS INDEX: 28.24 KG/M2

## 2025-06-19 LAB
AORTIC DIMENSIONLESS INDEX: 0.75 (DI)
AV MEAN PRESS GRAD SYS DOP V1V2: 4 MMHG
AV VMAX SYS DOP: 144 CM/SEC
BASOPHILS # BLD AUTO: 0.04 10*3/MM3 (ref 0–0.2)
BASOPHILS NFR BLD AUTO: 0.5 % (ref 0–1.5)
BH CV ECHO MEAS - ACS: 1.7 CM
BH CV ECHO MEAS - AO MAX PG: 8.3 MMHG
BH CV ECHO MEAS - AO V2 VTI: 34.5 CM
BH CV ECHO MEAS - AVA(I,D): 2.13 CM2
BH CV ECHO MEAS - EDV(CUBED): 59.3 ML
BH CV ECHO MEAS - EDV(MOD-SP4): 106 ML
BH CV ECHO MEAS - EF(MOD-SP4): 69.2 %
BH CV ECHO MEAS - ESV(CUBED): 15.6 ML
BH CV ECHO MEAS - ESV(MOD-SP4): 32.6 ML
BH CV ECHO MEAS - FS: 35.9 %
BH CV ECHO MEAS - IVS/LVPW: 0.78 CM
BH CV ECHO MEAS - IVSD: 0.7 CM
BH CV ECHO MEAS - LA DIMENSION: 2.6 CM
BH CV ECHO MEAS - LAT PEAK E' VEL: 9.6 CM/SEC
BH CV ECHO MEAS - LV MASS(C)D: 89.7 GRAMS
BH CV ECHO MEAS - LV MAX PG: 4.6 MMHG
BH CV ECHO MEAS - LV MEAN PG: 2 MMHG
BH CV ECHO MEAS - LV V1 MAX: 107 CM/SEC
BH CV ECHO MEAS - LV V1 VTI: 25.9 CM
BH CV ECHO MEAS - LVIDD: 3.9 CM
BH CV ECHO MEAS - LVIDS: 2.5 CM
BH CV ECHO MEAS - LVOT AREA: 2.8 CM2
BH CV ECHO MEAS - LVOT DIAM: 1.9 CM
BH CV ECHO MEAS - LVPWD: 0.9 CM
BH CV ECHO MEAS - MED PEAK E' VEL: 7.7 CM/SEC
BH CV ECHO MEAS - MR MAX PG: 16.6 MMHG
BH CV ECHO MEAS - MR MAX VEL: 204 CM/SEC
BH CV ECHO MEAS - MV A MAX VEL: 92.8 CM/SEC
BH CV ECHO MEAS - MV DEC SLOPE: 485 CM/SEC2
BH CV ECHO MEAS - MV DEC TIME: 0.24 SEC
BH CV ECHO MEAS - MV E MAX VEL: 82.8 CM/SEC
BH CV ECHO MEAS - MV E/A: 0.89
BH CV ECHO MEAS - MV MAX PG: 4.8 MMHG
BH CV ECHO MEAS - MV MEAN PG: 3 MMHG
BH CV ECHO MEAS - MV P1/2T: 70.7 MSEC
BH CV ECHO MEAS - MV V2 VTI: 35.7 CM
BH CV ECHO MEAS - MVA(P1/2T): 3.1 CM2
BH CV ECHO MEAS - MVA(VTI): 2.06 CM2
BH CV ECHO MEAS - PA ACC TIME: 0.13 SEC
BH CV ECHO MEAS - PA V2 MAX: 91.3 CM/SEC
BH CV ECHO MEAS - RV MAX PG: 1.55 MMHG
BH CV ECHO MEAS - RV V1 MAX: 62.3 CM/SEC
BH CV ECHO MEAS - RV V1 VTI: 14.6 CM
BH CV ECHO MEAS - SV(LVOT): 73.4 ML
BH CV ECHO MEAS - SV(MOD-SP4): 73.4 ML
BH CV ECHO MEAS - TAPSE (>1.6): 1.98 CM
BH CV ECHO MEAS - TR MAX PG: 20.6 MMHG
BH CV ECHO MEAS - TR MAX VEL: 227 CM/SEC
BH CV ECHO MEASUREMENTS AVERAGE E/E' RATIO: 9.57
BH CV XLRA - TDI S': 14.4 CM/SEC
DEPRECATED RDW RBC AUTO: 44.2 FL (ref 37–54)
EOSINOPHIL # BLD AUTO: 0.16 10*3/MM3 (ref 0–0.4)
EOSINOPHIL NFR BLD AUTO: 1.9 % (ref 0.3–6.2)
ERYTHROCYTE [DISTWIDTH] IN BLOOD BY AUTOMATED COUNT: 13.2 % (ref 12.3–15.4)
HCT VFR BLD AUTO: 38.5 % (ref 34–46.6)
HGB BLD-MCNC: 12.3 G/DL (ref 12–15.9)
IMM GRANULOCYTES # BLD AUTO: 0.03 10*3/MM3 (ref 0–0.05)
IMM GRANULOCYTES NFR BLD AUTO: 0.4 % (ref 0–0.5)
LV EF BIPLANE MOD: 69 %
LYMPHOCYTES # BLD AUTO: 1.95 10*3/MM3 (ref 0.7–3.1)
LYMPHOCYTES NFR BLD AUTO: 23.7 % (ref 19.6–45.3)
MCH RBC QN AUTO: 29.2 PG (ref 26.6–33)
MCHC RBC AUTO-ENTMCNC: 31.9 G/DL (ref 31.5–35.7)
MCV RBC AUTO: 91.4 FL (ref 79–97)
MONOCYTES # BLD AUTO: 0.74 10*3/MM3 (ref 0.1–0.9)
MONOCYTES NFR BLD AUTO: 9 % (ref 5–12)
NEUTROPHILS NFR BLD AUTO: 5.31 10*3/MM3 (ref 1.7–7)
NEUTROPHILS NFR BLD AUTO: 64.5 % (ref 42.7–76)
NRBC BLD AUTO-RTO: 0 /100 WBC (ref 0–0.2)
PLATELET # BLD AUTO: 221 10*3/MM3 (ref 140–450)
PMV BLD AUTO: 10.5 FL (ref 6–12)
QT INTERVAL: 406 MS
QTC INTERVAL: 434 MS
RBC # BLD AUTO: 4.21 10*6/MM3 (ref 3.77–5.28)
SINUS: 2.7 CM
STJ: 2.2 CM
VIT B12 BLD-MCNC: 1006 PG/ML (ref 211–946)
WBC NRBC COR # BLD AUTO: 8.23 10*3/MM3 (ref 3.4–10.8)

## 2025-06-19 PROCEDURE — 70498 CT ANGIOGRAPHY NECK: CPT

## 2025-06-19 PROCEDURE — 93306 TTE W/DOPPLER COMPLETE: CPT | Performed by: INTERNAL MEDICINE

## 2025-06-19 PROCEDURE — 25510000001 IOPAMIDOL PER 1 ML: Performed by: INTERNAL MEDICINE

## 2025-06-19 PROCEDURE — 99205 OFFICE O/P NEW HI 60 MIN: CPT | Performed by: NURSE PRACTITIONER

## 2025-06-19 PROCEDURE — 70496 CT ANGIOGRAPHY HEAD: CPT

## 2025-06-19 PROCEDURE — 97161 PT EVAL LOW COMPLEX 20 MIN: CPT

## 2025-06-19 PROCEDURE — 85025 COMPLETE CBC W/AUTO DIFF WBC: CPT

## 2025-06-19 PROCEDURE — G0378 HOSPITAL OBSERVATION PER HR: HCPCS

## 2025-06-19 PROCEDURE — 97165 OT EVAL LOW COMPLEX 30 MIN: CPT

## 2025-06-19 PROCEDURE — 93306 TTE W/DOPPLER COMPLETE: CPT

## 2025-06-19 PROCEDURE — 93246 EXT ECG>7D<15D RECORDING: CPT

## 2025-06-19 RX ORDER — IOPAMIDOL 755 MG/ML
100 INJECTION, SOLUTION INTRAVASCULAR
Status: COMPLETED | OUTPATIENT
Start: 2025-06-19 | End: 2025-06-19

## 2025-06-19 RX ORDER — ACETAMINOPHEN 325 MG/1
650 TABLET ORAL EVERY 4 HOURS PRN
Start: 2025-06-19

## 2025-06-19 RX ORDER — ASPIRIN 81 MG/1
81 TABLET ORAL DAILY
Status: DISCONTINUED | OUTPATIENT
Start: 2025-06-19 | End: 2025-06-19 | Stop reason: HOSPADM

## 2025-06-19 RX ORDER — ASPIRIN 81 MG/1
81 TABLET ORAL DAILY
Qty: 90 TABLET | Refills: 3 | Status: SHIPPED | OUTPATIENT
Start: 2025-06-20

## 2025-06-19 RX ORDER — HYDROXYZINE HYDROCHLORIDE 10 MG/1
10 TABLET, FILM COATED ORAL 3 TIMES DAILY PRN
Qty: 40 TABLET | Refills: 0 | Status: SHIPPED | OUTPATIENT
Start: 2025-06-19

## 2025-06-19 RX ORDER — ALPRAZOLAM 1 MG/1
1 TABLET ORAL 2 TIMES DAILY PRN
Status: DISCONTINUED | OUTPATIENT
Start: 2025-06-19 | End: 2025-06-19

## 2025-06-19 RX ORDER — IOPAMIDOL 755 MG/ML
100 INJECTION, SOLUTION INTRAVASCULAR
Status: DISCONTINUED | OUTPATIENT
Start: 2025-06-19 | End: 2025-06-19 | Stop reason: HOSPADM

## 2025-06-19 RX ADMIN — ASPIRIN 81 MG: 81 TABLET ORAL at 11:59

## 2025-06-19 RX ADMIN — GABAPENTIN 100 MG: 100 CAPSULE ORAL at 08:02

## 2025-06-19 RX ADMIN — PANTOPRAZOLE SODIUM 40 MG: 40 TABLET, DELAYED RELEASE ORAL at 08:02

## 2025-06-19 RX ADMIN — IOPAMIDOL 100 ML: 755 INJECTION, SOLUTION INTRAVENOUS at 11:15

## 2025-06-19 RX ADMIN — Medication 10 ML: at 08:02

## 2025-06-19 NOTE — PLAN OF CARE
"Goal Outcome Evaluation:  Plan of Care Reviewed With: patient           Outcome Evaluation: Pt is an 85 yo F with PMH including but not limited to RLS, colon CA, and anxiety. Pt admit to hospital 6/18/2025 d/t progression of neurologic changes including slurred speech, acute L-sided weakness. CT head/MRI brain demo no acute intracranial abnormality, chronic microhemorrhage in the left temporal lobe.        Pt reported she was living in [facility] with 3 [address] and tub shower unit. Resides with daughter/SAI. Pt independent with ADLs/IADLs, uses rollator as needed. Pt does not drive and family provides transportation. No recent falls per pt. Daughter works at night. Family able to assist at d/c. Pt active going to [facility] and \"hopes to go back to get stronger and stay active.\"        Pt needing CGA-close supervision for ADLs/mobility using RW. Min cues for safety, slower pace. Did exhibit mild weakness in LUE 3+/5 grossly and RUE 4/5. Pt reported and observed to need increased time for word finding and recall, but oriented. SBT score: 4/28; mild deficits. Majority of symptoms resolved despite minor pressure R posterior head, but has improved per pt. Based on skill set observed and pt reports, no skilled OT required at this time during acute care stay. Rec: HHOT to ensure safety/independence with ADLs/IADLs/mobility, along with any cog/safety concerns, home with family, shower seat    Anticipated Discharge Disposition (OT): home with home health, home with assist                        "

## 2025-06-19 NOTE — PLAN OF CARE
Problem: Adult Inpatient Plan of Care  Goal: Plan of Care Review  Outcome: Met  Flowsheets (Taken 6/19/2025 1354)  Progress: improving  Plan of Care Reviewed With: patient  Goal: Patient-Specific Goal (Individualized)  Outcome: Met  Goal: Absence of Hospital-Acquired Illness or Injury  Outcome: Met  Intervention: Identify and Manage Fall Risk  Recent Flowsheet Documentation  Taken 6/19/2025 1151 by Farzana Seaman RN  Safety Promotion/Fall Prevention:   assistive device/personal items within reach   clutter free environment maintained   fall prevention program maintained   nonskid shoes/slippers when out of bed   safety round/check completed   room organization consistent  Taken 6/19/2025 0800 by Farzana Seaman RN  Safety Promotion/Fall Prevention:   assistive device/personal items within reach   clutter free environment maintained   fall prevention program maintained   nonskid shoes/slippers when out of bed   safety round/check completed   room organization consistent  Intervention: Prevent Skin Injury  Recent Flowsheet Documentation  Taken 6/19/2025 1151 by Farzana Seaman RN  Body Position: position changed independently  Skin Protection: transparent dressing maintained  Taken 6/19/2025 0800 by Farzana Seaman RN  Body Position: position changed independently  Skin Protection: transparent dressing maintained  Intervention: Prevent and Manage VTE (Venous Thromboembolism) Risk  Recent Flowsheet Documentation  Taken 6/19/2025 1151 by Farzana Seaman RN  VTE Prevention/Management:   bilateral   SCDs (sequential compression devices) off   patient refused intervention  Taken 6/19/2025 0800 by Farzana Seaman RN  VTE Prevention/Management:   bilateral   SCDs (sequential compression devices) off   patient refused intervention  Intervention: Prevent Infection  Recent Flowsheet Documentation  Taken 6/19/2025 1151 by Farzana Seaman RN  Infection Prevention:   environmental surveillance performed   hand hygiene promoted    rest/sleep promoted   single patient room provided  Taken 6/19/2025 0800 by Farzana Seaman RN  Infection Prevention:   environmental surveillance performed   hand hygiene promoted   rest/sleep promoted   single patient room provided  Goal: Optimal Comfort and Wellbeing  Outcome: Met  Intervention: Provide Person-Centered Care  Recent Flowsheet Documentation  Taken 6/19/2025 1151 by Farzana Seaman RN  Trust Relationship/Rapport:   care explained   thoughts/feelings acknowledged  Taken 6/19/2025 0800 by Farzana Seaman RN  Trust Relationship/Rapport:   thoughts/feelings acknowledged   care explained  Goal: Readiness for Transition of Care  Outcome: Met     Problem: Stroke, Ischemic (Includes Transient Ischemic Attack)  Goal: Optimal Coping  Outcome: Met  Intervention: Support Psychosocial Response to Stroke  Recent Flowsheet Documentation  Taken 6/19/2025 1151 by Farzana Seaman RN  Family/Support System Care: self-care encouraged  Taken 6/19/2025 0800 by Farzana Seaman RN  Family/Support System Care: self-care encouraged  Goal: Effective Bowel Elimination  Outcome: Met  Goal: Optimal Cerebral Tissue Perfusion  Outcome: Met  Intervention: Protect and Optimize Cerebral Perfusion  Recent Flowsheet Documentation  Taken 6/19/2025 1151 by Farzana Seaman RN  Sensory Stimulation Regulation: care clustered  Taken 6/19/2025 0800 by Farzana Seaman RN  Sensory Stimulation Regulation: care clustered  Goal: Optimal Cognitive Function  Outcome: Met  Intervention: Optimize Cognitive Function  Recent Flowsheet Documentation  Taken 6/19/2025 1151 by Farzana Seaman RN  Sensory Stimulation Regulation: care clustered  Taken 6/19/2025 0800 by Farzana Seaman RN  Sensory Stimulation Regulation: care clustered  Goal: Improved Communication Skills  Outcome: Met  Intervention: Optimize Communication Skills  Recent Flowsheet Documentation  Taken 6/19/2025 1151 by Farzana Seaman RN  Communication Enhancement Strategies:   call light answered in  person   communication board used  Taken 6/19/2025 0800 by Farzana Seaman RN  Communication Enhancement Strategies:   call light answered in person   communication board used  Goal: Optimal Functional Ability  Outcome: Met  Intervention: Optimize Functional Ability  Recent Flowsheet Documentation  Taken 6/19/2025 1151 by Farzana Seaman RN  Activity Management: activity encouraged  Taken 6/19/2025 0800 by Farzana Seaman RN  Activity Management: activity encouraged  Goal: Optimal Nutrition Intake  Outcome: Met  Goal: Effective Oxygenation and Ventilation  Outcome: Met  Intervention: Optimize Oxygenation and Ventilation  Recent Flowsheet Documentation  Taken 6/19/2025 1151 by Farzana Seaman RN  Head of Bed (HOB) Positioning: HOB elevated  Taken 6/19/2025 0800 by Farzana Seaman RN  Head of Bed (HOB) Positioning: HOB elevated  Goal: Improved Sensorimotor Function  Outcome: Met  Intervention: Optimize Range of Motion, Motor Control and Function  Recent Flowsheet Documentation  Taken 6/19/2025 1151 by Farzana Seaman RN  Positioning/Transfer Devices:   pillows   in use  Taken 6/19/2025 0800 by Farzana Seaman RN  Positioning/Transfer Devices:   pillows   in use  Intervention: Optimize Sensory and Perceptual Ability  Recent Flowsheet Documentation  Taken 6/19/2025 1151 by Farzana Seaman RN  Pressure Reduction Techniques: frequent weight shift encouraged  Pressure Reduction Devices: pressure-redistributing mattress utilized  Taken 6/19/2025 0800 by Farzana Seaman RN  Pressure Reduction Techniques: frequent weight shift encouraged  Pressure Reduction Devices: pressure-redistributing mattress utilized  Goal: Safe and Effective Swallow  Outcome: Met  Goal: Effective Urinary Elimination  Outcome: Met     Problem: Comorbidity Management  Goal: Blood Pressure in Desired Range  Outcome: Met  Intervention: Maintain Blood Pressure Management  Recent Flowsheet Documentation  Taken 6/19/2025 1151 by Farzana Seaman RN  Medication  Review/Management: medications reviewed  Taken 6/19/2025 0800 by Farzana Seaman RN  Medication Review/Management: medications reviewed   Goal Outcome Evaluation:  Plan of Care Reviewed With: patient        Progress: improving

## 2025-06-19 NOTE — ACP (ADVANCE CARE PLANNING)
Patient reports to have POA paperwork. Informed patient we do not have on file and to bring documents to be applied to chart. Patient and family acknowledged, will attempt to bring documents when able. Education over HCR and POST form. Gave patient a copy of both forms. Will contact if needing further assistance.    Chaplain Fabián Storm

## 2025-06-19 NOTE — THERAPY EVALUATION
"Patient Name: Pura Alexander  : 1940    MRN: [id number]                              Today's Date: 2025       Admit Date: 2025    Visit Dx:     ICD-10-CM ICD-9-CM   1. Acute CVA (cerebrovascular accident)  I63.9 434.91   2. TIA (transient ischemic attack)  G45.9 435.9     Patient Active Problem List   Diagnosis    Slurred speech    Acute left-sided weakness    Anxiety    RLS (restless legs syndrome)    History of colon cancer    Neurologic abnormality     History reviewed. No pertinent past medical history.  History reviewed. No pertinent surgical history.   General Information       Row Name 25          OT Time and Intention    Subjective Information no complaints  -AN     Document Type evaluation  -AN     Mode of Treatment occupational therapy  -AN     Patient Effort excellent  -AN     Symptoms Noted During/After Treatment none  -AN       Row Name 25          General Information    Patient Profile Reviewed yes  -AN     Prior Level of Function independent:;all household mobility;ADL's;home management  -AN     Existing Precautions/Restrictions fall  -AN     Barriers to Rehab none identified  -AN       Row Name 25          Occupational Profile    Reason for Services/Referral (Occupational Profile) Pt reported she was living in [facility] with 3 [address] and tub shower unit. Resides with daughter/SAI. Pt independent with ADLs/IADLs, uses rollator as needed. Pt does not drive and family provides transportation. No recent falls per pt. Daughter works at night. Family able to assist at d/c. Pt active going to [facility] and \"hopes to go back to get stronger and stay active.\"  -AN       Row Name 25          Living Environment    Current Living Arrangements home  -AN     People in Home child(stephanie), adult;other (see comments)  Daughter and SAI  -AN       Row Name 25          Home Main Entrance    Number of Stairs, Main Entrance three  2 + 1 VALENTINA garage entrance; 3 VAL[address]INA " front entrance  -AN     Stair Railings, Main Entrance none  -AN       Row Name 06/19/25 0818          Cognition    Orientation Status (Cognition) oriented x 4;other (see comments)  Pt reported and observed to need increased time for word finding and recall, but oriented. SBT score: 4/28; mild deficits.  -AN       Row Name 06/19/25 0818          Safety Issues/Impairments Affecting Functional Mobility    Impairments Affecting Function (Mobility) strength  -AN               User Key  (r) = Recorded By, (t) = Taken By, (c) = Cosigned By      Initials Name Provider Type    AN Robin Briseno OT Occupational Therapist                     Mobility/ADL's       Row Name 06/19/25 1114          Transfers    Transfers stand-sit transfer;sit-stand transfer;toilet transfer;bed-chair transfer  -AN       Row Name 06/19/25 1114          Bed-Chair Transfer    Bed-Chair [place] (Transfers) contact guard;verbal cues  -AN     Assistive Device (Bed-Chair Transfers) walker, front-wheeled  -AN       Row Name 06/19/25 1114          Sit-Stand Transfer    Sit-Stand [place] (Transfers) contact guard;verbal cues  -AN     Assistive Device (Sit-Stand Transfers) walker, front-wheeled  -AN       Row Name 06/19/25 1114          Stand-Sit Transfer    Stand-Sit [place] (Transfers) contact guard;verbal cues  -AN     Assistive Device (Stand-Sit Transfers) walker, front-wheeled  -AN       Row Name 06/19/25 1114          Toilet Transfer    [place] Level (Toilet Transfer) contact guard;verbal cues  -AN     Assistive Device (Toilet Transfer) commode  -AN     Comment, (Toilet Transfer) Ambulatory  -AN       Row Name 06/19/25 1114          Functional Mobility    Functional Mobility- Ind. Level contact guard assist  -AN     Functional Mobility- Device walker, front-wheeled  -AN     Functional Mobility- Comment EOB > commode in bathroom > sink > around room ~15 ft > chair, CGA, increased time, slower pace, no LOB.  -AN     Patient was able  to Ambulate yes  -AN       Row Name 06/19/25 1114          Activities of Daily Living    BADL Assessment/Intervention upper body dressing;lower body dressing;grooming;toileting  -AN       Row Name 06/19/25 1114          Upper Body Dressing Assessment/Training    [place] Level (Upper Body Dressing) don;not tested;modified independence  Gown  -AN     Position (Upper Body Dressing) edge of bed sitting  -AN       Row Name 06/19/25 1114          Lower Body Dressing Assessment/Training    [place] Level (Lower Body Dressing) socks;supervision  -AN     Position (Lower Body Dressing) edge of bed sitting  -AN       Row Name 06/19/25 1114          Grooming Assessment/Training    [place] Level (Grooming) hair care, combing/brushing;wash face, hands;supervision  -AN     Position (Grooming) sink side  -AN       Row Name 06/19/25 1114          Toileting Assessment/Training    [place] Level (Toileting) contact guard assist;verbal cues  -AN     Assistive Devices (Toileting) commode  -AN     Position (Toileting) unsupported standing  -AN               User Key  (r) = Recorded By, (t) = Taken By, (c) = Cosigned By      Initials Name Provider Type    AN Robin Briseno OT Occupational Therapist                   Obj/Interventions       Row Name 06/19/25 1116          Sensory Assessment (Somatosensory)    Sensory Assessment (Somatosensory) sensation intact  -AN       Row Name 06/19/25 1116          Vision Assessment/Intervention    Visual Impairment/Limitations WFL  -AN       Row Name 06/19/25 1116          Range of Motion Comprehensive    General Range of Motion no range of motion deficits identified  -AN       Row Name 06/19/25 1116          Strength Comprehensive (MMT)    General Manual Muscle Testing (MMT) Assessment upper extremity strength deficits identified  -AN     Comment, General Manual Muscle Testing (MMT) Assessment LUE 3+/5 grossly; RUE 4/5 grossly; Able to use functionally.  -AN       Row Name  "06/19/25 1116          Balance    Balance Assessment sitting static balance;sitting dynamic balance;standing static balance;standing dynamic balance  -AN     Static Sitting Balance independent  -AN     Dynamic Sitting Balance supervision  -AN     Position, Sitting Balance sitting edge of bed  -AN     Static Standing Balance supervision  -AN     Dynamic Standing Balance contact guard  -AN     Position/Device Used, Standing Balance walker, front-wheeled  -AN               User Key  (r) = Recorded By, (t) = Taken By, (c) = Cosigned By      Initials Name Provider Type    AN Robin Briseno OT Occupational Therapist                   Goals/Plan    No documentation.                  Clinical Impression       Row Name 06/19/25 1117          Pain Assessment    Pretreatment Pain Rating 2/10  -AN     Posttreatment Pain Rating 2/10  -AN     Pain Management Interventions nursing notified  -AN     Pre/Posttreatment Pain Comment R posterior head, has improved per pt.  -AN       Row Name 06/19/25 1117          Plan of Care Review    Plan of Care Reviewed With patient  -AN     Outcome Evaluation Pt is an 85 yo F with PMH including but not limited to RLS, colon CA, and anxiety. Pt admit to hospital 6/18/2025 d/t progression of neurologic changes including slurred speech, acute L-sided weakness. CT head/MRI brain demo no acute intracranial abnormality, chronic microhemorrhage in the left temporal lobe.    Pt reported she was living in [facility] with 3 [address] and tub shower unit. Resides with daughter/SAI. Pt independent with ADLs/IADLs, uses rollator as needed. Pt does not drive and family provides transportation. No recent falls per pt. Daughter works at night. Family able to assist at d/c. Pt active going to [facility] and \"hopes to go back to get stronger and stay active.\"    Pt needing CGA-close supervision for ADLs/mobility using RW. Min cues for safety, slower pace. Did exhibit mild weakness in LUE 3+/5 grossly and RUE 4/5. Pt reported and " observed to need increased time for word finding and recall, but oriented. SBT score: 4/28; mild deficits. Majority of symptoms resolved despite minor pressure R posterior head, but has improved per pt. Based on skill set observed and pt reports, no skilled OT required at this time during acute care stay. Rec: HHOT to ensure safety/independence with ADLs/IADLs/mobility, along with any cog/safety concerns, home with family, shower seat  -AN       Row Name 06/19/25 1117          Therapy Assessment/Plan (OT)    Rehab Potential (OT) good  -AN     Criteria for Skilled Therapeutic Interventions Met (OT) no problems identified which require skilled intervention  -AN       [facility] Name 06/19/25 1117          Therapy Plan Review/Discharge Plan (OT)    Equipment Needs Upon Discharge (OT) shower chair  -AN     Anticipated Discharge Disposition (OT) home with home health;home with assist  -AN       [facility] Name 06/19/25 1117          Vital Signs    Pre Systolic BP Rehab 136  -AN     Pre Treatment Diastolic BP 68  -AN     Pretreatment Heart Rate (beats/min) 68  -AN     Intratreatment Heart Rate (beats/min) 86  -AN     Pre SpO2 (%) 100  -AN     O2 Delivery Pre Treatment room air  -AN     Pre Patient Position Sitting  -AN     Intra Patient Position Standing  -AN     Post Patient Position Sitting  -AN       [facility] Name 06/19/25 1117          Positioning and Restraints    Pre-Treatment Position in bed  -AN     Post Treatment Position chair  -AN     In Chair notified nsg;reclined;sitting;call light within reach;encouraged to call for assist;exit alarm on  -AN               User Key  (r) = Recorded By, (t) = Taken By, (c) = Cosigned By      Initials Name Provider Type    AN Robin Briseno OT Occupational Therapist                   Outcome Measures       Row Name 06/19/25 1119          How much help from another is currently needed...    Putting on and taking off regular lower body clothing? 3  -AN     Bathing (including washing, rinsing, and  drying) 3  -AN     Toileting (which includes using toilet bed pan or urinal) 3  -AN     Putting on and taking off regular upper body clothing 3  -AN     Taking care of personal grooming (such as brushing teeth) 3  -AN     Eating meals 4  -AN     AM-PAC 6 Clicks Score (OT) 19  -AN       Row Name 06/19/25 0800          How much help from another person do you currently need...    Turning from your back to your side while in flat bed without using bedrails? 4  -CM     Moving from lying on back to sitting on the side of a flat bed without bedrails? 4  -CM     Moving to and from a bed to a chair (including a wheelchair)? 3  -CM     Standing up from a chair using your arms (e.g., wheelchair, bedside chair)? 4  -CM     Climbing 3-5 steps with a railing? 2  -CM     To walk in hospital room? 3  -CM     AM-PAC 6 Clicks Score (PT) 20  -CM       Row Name 06/19/25 1119          Functional Assessment    Outcome Measure Options AM-[facility] 6 Clicks Daily Activity (OT)  -AN               User Key  (r) = Recorded By, (t) = Taken By, (c) = Cosigned By      Initials Name Provider Type    Farzana Avery, RN Registered Nurse    Robin Uribe OT Occupational Therapist                    Occupational Therapy Education       Title: PT OT SLP Therapies (Done)       Topic: Occupational Therapy (Done)       Point: ADL training (Done)       Learning Progress Summary            Patient Acceptance, E,TB, VU by AN at 6/19/2025 1119                      Point: Home exercise program (Done)       Learning Progress Summary            Patient Acceptance, E,TB, VU by AN at 6/19/2025 1119                      Point: Precautions (Done)       Learning Progress Summary            Patient Acceptance, E,TB, VU by AN at 6/19/2025 1119                      Point: Body mechanics (Done)       Learning Progress Summary            Patient Acceptance, E,TB, VU by AN at 6/19/2025 1119                                      User Key       Initials Effective Dates  "Name Provider Type Discipline    ALCON 06/05/25 -  Robin Briseno OT Occupational Therapist OT                  OT Recommendation and Plan     Plan of Care Review  Plan of Care Reviewed With: patient  Outcome Evaluation: Pt is an 83 yo F with PMH including but not limited to RLS, colon CA, and anxiety. Pt admit to hospital 6/18/2025 d/t progression of neurologic changes including slurred speech, acute L-sided weakness. CT head/MRI brain demo no acute intracranial abnormality, chronic microhemorrhage in the left temporal lobe.    Pt reported she was living in [facility] with 3 [address] and tub shower unit. Resides with daughter/SAI. Pt independent with ADLs/IADLs, uses rollator as needed. Pt does not drive and family provides transportation. No recent falls per pt. Daughter works at night. Family able to assist at d/c. Pt active going to [facility] and \"hopes to go back to get stronger and stay active.\"    Pt needing CGA-close supervision for ADLs/mobility using RW. Min cues for safety, slower pace. Did exhibit mild weakness in LUE 3+/5 grossly and RUE 4/5. Pt reported and observed to need increased time for word finding and recall, but oriented. SBT score: 4/28; mild deficits. Majority of symptoms resolved despite minor pressure R posterior head, but has improved per pt. Based on skill set observed and pt reports, no skilled OT required at this time during acute care stay. Rec: HHOT to ensure safety/independence with ADLs/IADLs/mobility, along with any cog/safety concerns, home with family, shower seat     Time Calculation:         Time Calculation- OT       Row Name 06/19/25 1120             Time Calculation- OT    OT Start Time 0818  -AN      OT Stop Time 0839  -AN      OT Time Calculation (min) 21 min  -AN      Total Timed Code Minutes- OT 0 minute(s)  -AN      OT Received On 06/19/25  -AN                User Key  (r) = Recorded By, (t) = Taken By, (c) = Cosigned By      Initials Name Provider Type    Robin Uribe OT " Occupational Therapist                  Therapy Charges for Today       Code Description Service Date Service Provider Modifiers Qty    [id number] HC OT EVAL LOW COMPLEXITY 4 6/19/2025 Robin Briseno, VERA GO 1                 Robin Briseno OT  6/19/2025

## 2025-06-19 NOTE — PLAN OF CARE
Goal Outcome Evaluation:  Plan of Care Reviewed With: patient, child  Pura Alexander is an 84-year-old female who presents to [facility] ED on 6/18/2025 after a week progression of slurred speech and dragging right leg. MRI brain: No acute intracranial abnormality. CT head (-). CXR (-). PMHx: hx colon CA, anxiety. Pt denies any pain or dizziness presently. Pt is speaking clearly. Pt A and O x 4. Pt resides with daughter and SAI in [facility] with 2 [address] without rail and 1 interior step. Pt reports she holds on to the walls in her home and she has a rollator if needed. AROM and strength BLE equal and WNL. Pt has good static standing balance with rollator support. Pt transfers with CGA of 1 and she ambulated 120 feet with rollator with CGA of 1 with verbal cues to pt to not push the rollator too far forward. PT recommended to pt that she use her rollator at all times and pt was agreeable. PT will follow pt remotely with PT recommendation of Home with Assist and Home health PT.               Anticipated Discharge Disposition (PT): home with home health, home with supervision

## 2025-06-19 NOTE — DISCHARGE SUMMARY
Date of Discharge:  2025    Discharge Diagnosis:   **Neurologic abnormality [R29.818]   Slurred speech [R47.81]   Acute left-sided weakness [R53.1]   Anxiety [F41.9]   RLS (restless legs syndrome) [G25.81]   History of colon cancer [Z85.038]       Presenting Problem/History of Present Illness  Active Hospital Problems    Diagnosis  POA    **Neurologic abnormality [R29.818]  Yes    Slurred speech [R47.81]  Unknown    Acute left-sided weakness [R53.1]  Unknown    Anxiety [F41.9]  Unknown    RLS (restless legs syndrome) [G25.81]  Unknown    History of colon cancer [Z85.038]  Unknown      Resolved Hospital Problems   No resolved problems to display.          Hospital Course  Patient is a 84 y.o. female who presented with stuttering symptoms of slurred speech, confusion and questionable left leg weakness over the last week, often exacerbated by emotional stress, such as being at her sister's .  Stroke workup was unremarkable.  She will be discharged home with Holter monitor.  She had no objective neurologic symptoms while in the hospital.  She has been started on aspirin.  High dose statin was not prescribed as it is not clear that this is a TIA and she has tolerated statins poorly in the past. She will follow up with PCP and with outpatient neurology clinic.    Procedures Performed         Consults:   Consults       Date and Time Order Name Status Description    2025  2:53 PM Inpatient Neurology Consult Stroke Completed     2025  1:33 PM Family Medicine Consult              Pertinent Test Results:    Lab Results (most recent)       Procedure Component Value Units Date/Time    Vitamin B12 [[id number]]  (Abnormal) Collected: 25 1221    Specimen: Blood Updated: 25 1657     Vitamin B-12 1,006 pg/mL     Narrative:      Results may be falsely increased if patient taking Biotin.      Urine Culture - Urine, Urine, Clean Catch [[id number]]  (Normal) Collected: 25 1803    Specimen: Urine,  Clean Catch Updated: 06/19/25 0759     Urine Culture No growth    CBC & Differential [[id number]] Collected: 06/18/25 2232    Specimen: Blood Updated: 06/19/25 0609    Narrative:      The following orders were created for panel order CBC & Differential.  Procedure                               Abnormality         Status                     ---------                               -----------         ------                     CBC Auto Differential[[id number]]        Normal              Final result               Scan Slide[[id number]]                                                                    Please view results for these tests on the individual orders.    CBC Auto Differential [[id number]]  (Normal) Collected: 06/19/25 0552    Specimen: Blood Updated: 06/19/25 0609     WBC 8.23 10*3/mm3      RBC 4.21 10*6/mm3      Hemoglobin 12.3 g/dL      Hematocrit 38.5 %      MCV 91.4 fL      MCH 29.2 pg      MCHC 31.9 g/dL      RDW 13.2 %      RDW-SD 44.2 fl      MPV 10.5 fL      Platelets 221 10*3/mm3      Neutrophil % 64.5 %      Lymphocyte % 23.7 %      Monocyte % 9.0 %      Eosinophil % 1.9 %      Basophil % 0.5 %      Immature Grans % 0.4 %      Neutrophils, Absolute 5.31 10*3/mm3      Lymphocytes, Absolute 1.95 10*3/mm3      Monocytes, Absolute 0.74 10*3/mm3      Eosinophils, Absolute 0.16 10*3/mm3      Basophils, Absolute 0.04 10*3/mm3      Immature Grans, Absolute 0.03 10*3/mm3      nRBC 0.0 /100 WBC     Basic Metabolic Panel [[id number]]  (Abnormal) Collected: 06/18/25 2232    Specimen: Blood Updated: 06/18/25 2323     Glucose 95 mg/dL      BUN 18.5 mg/dL      Creatinine 1.16 mg/dL      Sodium 140 mmol/L      Potassium 4.3 mmol/L      Comment: Specimen hemolyzed.  Result may be falsely elevated.        Chloride 108 mmol/L      CO2 23.2 mmol/L      Calcium 8.6 mg/dL      BUN/Creatinine Ratio 15.9     Anion Gap 8.8 mmol/L      eGFR 46.3 mL/min/1.73     Narrative:      GFR Categories in Chronic Kidney Disease (CKD)               GFR Category          GFR (mL/min/1.73)    Interpretation  G1                    90 or greater        Normal or high (1)  G2                    60-89                Mild decrease (1)  G3a                   45-59                Mild to moderate decrease  G3b                   30-44                Moderate to severe decrease  G4                    15-29                Severe decrease  G5                    14 or less           Kidney failure    (1)In the absence of evidence of kidney disease, neither GFR category G1 or G2 fulfill the criteria for CKD.    eGFR calculation 2021 CKD-EPI creatinine equation, which does not include race as a factor    Lipid Panel [[id number]]  (Abnormal) Collected: 06/18/25 1756    Specimen: Blood Updated: 06/18/25 1834     Total Cholesterol 165 mg/dL      Triglycerides 98 mg/dL      HDL Cholesterol 44 mg/dL      LDL Cholesterol  103 mg/dL      VLDL Cholesterol 18 mg/dL      LDL/HDL Ratio 2.30    Narrative:      Cholesterol Reference Ranges  (U.S. Department of Health and Human Services ATP III Classifications)    Desirable          <200 mg/dL  Borderline High    200-239 mg/dL  High Risk          >240 mg/dL      Triglyceride Reference Ranges  (U.S. Department of Health and Human Services ATP III Classifications)    Normal           <150 mg/dL  Borderline High  150-199 mg/dL  High             200-499 mg/dL  Very High        >500 mg/dL    HDL Reference Ranges  (U.S. Department of Health and Human Services ATP III Classifications)    Low     <40 mg/dl (major risk factor for CHD)  High    >60 mg/dl ('negative' risk factor for CHD)        LDL Reference Ranges  (U.S. Department of Health and Human Services ATP III Classifications)    Optimal          <100 mg/dL  Near Optimal     100-129 mg/dL  Borderline High  130-159 mg/dL  High             160-189 mg/dL  Very High        >189 mg/dL    LDL is calculated using the NIH LDL-C calculation.      Urinalysis With Culture If Indicated - Urine,  Clean Catch [[id number]]  (Abnormal) Collected: 06/18/25 1803    Specimen: Urine, Clean Catch Updated: 06/18/25 1818     Color, UA Yellow     Appearance, UA Clear     pH, UA 7.5     Specific Gravity, UA 1.006     Glucose, UA Negative     Ketones, UA Negative     Bilirubin, UA Negative     Blood, UA Negative     Protein, UA Negative     Leuk Esterase, UA Moderate (2+)     Nitrite, UA Negative     Urobilinogen, UA 0.2 E.U./dL    Narrative:      In absence of clinical symptoms, the presence of pyuria, bacteria, and/or nitrites on the urinalysis result does not correlate with infection.    Urinalysis, Microscopic Only - Urine, Clean Catch [[id number]]  (Abnormal) Collected: 06/18/25 1803    Specimen: Urine, Clean Catch Updated: 06/18/25 1818     RBC, UA 0-2 /HPF      WBC, UA 6-10 /HPF      Bacteria, UA None Seen /HPF      Squamous Epithelial Cells, UA 0-2 /HPF      Hyaline Casts, UA None Seen /LPF      Methodology Automated Microscopy    TSH [[id number]]  (Normal) Collected: 06/18/25 1221    Specimen: Blood Updated: 06/18/25 1544     TSH 1.980 uIU/mL     T4, Free [[id number]]  (Normal) Collected: 06/18/25 1221    Specimen: Blood Updated: 06/18/25 1544     Free T4 1.10 ng/dL     Magnesium [[id number]]  (Normal) Collected: 06/18/25 1221    Specimen: Blood Updated: 06/18/25 1544     Magnesium 2.3 mg/dL     Hemoglobin A1c [[id number]]  (Normal) Collected: 06/18/25 1221    Specimen: Blood Updated: 06/18/25 1534     Hemoglobin A1C 5.19 %     Narrative:      Hemoglobin A1C Ranges:    Increased Risk for Diabetes  5.7% to 6.4%  Diabetes                     >= 6.5%  Diabetic Goal                < 7.0%    Comprehensive Metabolic Panel [[id number]] Collected: 06/18/25 1221    Specimen: Blood Updated: 06/18/25 1256     Glucose 94 mg/dL      BUN 16.0 mg/dL      Creatinine 0.92 mg/dL      Sodium 142 mmol/L      Potassium 4.4 mmol/L      Chloride 107 mmol/L      CO2 25.6 mmol/L      Calcium 9.2 mg/dL      Total Protein 6.7 g/dL      Albumin  4.0 g/dL      ALT (SGPT) 12 U/L      AST (SGOT) 21 U/L      Alkaline Phosphatase 89 U/L      Total Bilirubin 0.7 mg/dL      Globulin 2.7 gm/dL      A/G Ratio 1.5 g/dL      BUN/Creatinine Ratio 17.4     Anion Gap 9.4 mmol/L      eGFR 61.1 mL/min/1.73     Narrative:      GFR Categories in Chronic Kidney Disease (CKD)              GFR Category          GFR (mL/min/1.73)    Interpretation  G1                    90 or greater        Normal or high (1)  G2                    60-89                Mild decrease (1)  G3a                   45-59                Mild to moderate decrease  G3b                   30-44                Moderate to severe decrease  G4                    15-29                Severe decrease  G5                    14 or less           Kidney failure    (1)In the absence of evidence of kidney disease, neither GFR category G1 or G2 fulfill the criteria for CKD.    eGFR calculation 2021 CKD-EPI creatinine equation, which does not include race as a factor    Protime-INR [[id number]]  (Normal) Collected: 06/18/25 1221    Specimen: Blood Updated: 06/18/25 1237     Protime 14.2 Seconds      INR 1.10    [place] Draw [[id number]] Collected: 06/18/25 1221    Specimen: Blood Updated: 06/18/25 1231    Narrative:      The following orders were created for panel order [place] Draw.  Procedure                               Abnormality         Status                     ---------                               -----------         ------                     Green Top (Gel)[[id number]]                                  Final result               Lavender Top[[id number]]                                     Final result               Gold Top - SST[[id number]]                                   Final result               Light Blue Top[[id number]]                                   Final result                 Please view results for these tests on the individual orders.    Green Top (Gel) [[id number]] Collected: 06/18/25 1221     Specimen: Blood Updated: 06/18/25 1231     Extra Tube Hold for add-ons.     Comment: Auto resulted.       Lavender Top [[id number]] Collected: 06/18/25 1221    Specimen: Blood Updated: 06/18/25 1231     Extra Tube hold for add-on     Comment: Auto resulted       Gold Top - SST [[id number]] Collected: 06/18/25 1221    Specimen: Blood Updated: 06/18/25 1231     Extra Tube Hold for add-ons.     Comment: Auto resulted.       Light Blue Top [[id number]] Collected: 06/18/25 1221    Specimen: Blood Updated: 06/18/25 1231     Extra Tube Hold for add-ons.     Comment: Auto resulted       CBC & Differential [[id number]]  (Normal) Collected: 06/18/25 1221    Specimen: Blood Updated: 06/18/25 1227    Narrative:      The following orders were created for panel order CBC & Differential.  Procedure                               Abnormality         Status                     ---------                               -----------         ------                     CBC Auto Differential[[id number]]        Normal              Final result                 Please view results for these tests on the individual orders.    CBC Auto Differential [[id number]]  (Normal) Collected: 06/18/25 1221    Specimen: Blood Updated: 06/18/25 1227     WBC 8.56 10*3/mm3      RBC 4.63 10*6/mm3      Hemoglobin 13.5 g/dL      Hematocrit 42.1 %      MCV 90.9 fL      MCH 29.2 pg      MCHC 32.1 g/dL      RDW 13.2 %      RDW-SD 44.0 fl      MPV 10.3 fL      Platelets 253 10*3/mm3      Neutrophil % 66.0 %      Lymphocyte % 22.7 %      Monocyte % 8.9 %      Eosinophil % 1.4 %      Basophil % 0.6 %      Immature Grans % 0.4 %      Neutrophils, Absolute 5.66 10*3/mm3      Lymphocytes, Absolute 1.94 10*3/mm3      Monocytes, Absolute 0.76 10*3/mm3      Eosinophils, Absolute 0.12 10*3/mm3      Basophils, Absolute 0.05 10*3/mm3      Immature Grans, Absolute 0.03 10*3/mm3      nRBC 0.0 /100 WBC              Results for orders placed during the hospital encounter of  06/18/25    Adult Transthoracic Echo Complete W/ Cont if Necessary Per Protocol    Interpretation Summary    Left ventricular ejection fraction appears to be 66 - 70%.    Left ventricular diastolic function is consistent with (grade I) impaired relaxation.    Estimated right ventricular systolic pressure from tricuspid regurgitation is normal (<35 mmHg).    Electronically signed by Jeanne Merino MD, 06/19/25, 1:52 PM EDT.              Condition on Discharge:  Stable    Vital Signs  Temp:  [97.6 °F (36.4 °C)-98.2 °F (36.8 °C)] 98 °F (36.7 °C)  Heart Rate:  [57-62] 62  Resp:  [13-19] 13  BP: (116-154)/(44-96) 153/72    Physical Exam:     General Appearance:    Alert, cooperative, in no acute distress   Head:    Normocephalic, without obvious abnormality, atraumatic   Eyes:            Lids and lashes normal, conjunctivae and sclerae normal, no   icterus, no pallor, corneas clear, PERRLA   Ears:    Ears appear intact with no abnormalities noted   Throat:   No oral lesions, no thrush, oral mucosa moist   Neck:   No adenopathy, supple, trachea midline, no thyromegaly, no   carotid bruit, no JVD   Lungs:     Clear to auscultation,respirations regular, even and                  unlabored    Heart:    Regular rhythm and normal rate, normal S1 and S2, no            murmur, no gallop, no rub, no click   Chest Wall:    No abnormalities observed   Abdomen:     Normal bowel sounds, no masses, no organomegaly, soft        non-tender, non-distended, no guarding, no rebound                tenderness   Extremities:   Moves all extremities well, no edema, no cyanosis, no             redness   Pulses:   Pulses palpable and equal bilaterally   Skin:   No bleeding, bruising or rash   Lymph nodes:   No palpable adenopathy   Neurologic:   Cranial nerves 2 - 12 grossly intact, sensation intact, DTR       present and equal bilaterally       Discharge Disposition  Home or Self Care    Discharge Medications     Discharge Medications         New Medications        Instructions Start Date   acetaminophen 325 MG tablet  Commonly known as: TYLENOL   650 mg, Oral, Every 4 Hours PRN      aspirin 81 MG EC tablet   81 mg, Oral, Daily   Start Date: June 20, 2025     hydrOXYzine 10 MG tablet  Commonly known as: ATARAX   10 mg, Oral, 3 Times Daily PRN             Continue These Medications        Instructions Start Date   gabapentin 100 MG capsule  Commonly known as: NEURONTIN   100 mg, Oral, 2 Times Daily             Stop These Medications      ALPRAZolam 0.5 MG tablet  Commonly known as: XANAX              Discharge Diet: Regular    Activity at Discharge: No restrictions    Follow-up Appointments  No future appointments.  Additional Instructions for the Follow-ups that You Need to Schedule       Ambulatory Referral to Home Health   As directed      Face to Face Visit Date: 6/19/2025   Follow-up provider for Plan of Care?: I treated the patient in an acute care facility and will not continue treatment after discharge.   Follow-up provider: BRODY SAAVEDRA [[id number]]   Reason/Clinical Findings: TIA   Describe mobility limitations that make leaving home difficult: generalized weakness   Nursing/Therapeutic Services Requested: Physical Therapy   Frequency: 1 Week 1        Ambulatory Referral to Neurology   As directed      Requested service: [facility]        Discharge Follow-up with PCP   As directed       Currently Documented PCP:    Brody Saavedra MD    PCP Phone Number:    [phone]     Follow Up Details: Call to schedule a hospital follow up appointment.                Test Results Pending at Discharge  Pending Results       Procedure [Order ID] Specimen - Date/Time    Holter Monitor - 72 Hour Up To 15 Days - In process [[id number]] Resulted: 06/19/25 1354     Updated: 06/19/25 1354    This result has not been signed. Information might be incomplete.               Lulu Emmanuel MD  06/19/25  17:16 EDT    Time: Discharge 25 min

## 2025-06-19 NOTE — CONSULTS
Primary Care Provider: Provider, No Known     Consult requested by: Dr. Emmanuel     Reason for Consultation: Neurological evaluation, Stroke     History taken from: patient chart RN    Chief complaint: headache, left side weakness, slurred speech        SUBJECTIVE:    History of present illness: Background per H&P: Pura Alexander is a 84 y.o. female who presents to [facility] ED on 2025 after a week progression of neurologic changes. Patient has a past medical history of colon cancer. She overall takes no prescribed medication. Patient reports she was at her sisters  when she developed severe head pain describing it as a band circling the top of her head. She was having trouble getting her words out. She had her daughters take her outside and sit in a car in front of the air conditioner. Patient reports this head pain\pressure has been present for the past week coming and going. She does report slurred speech but this has improved. Patient also had significant left sided weakness which has also improved. Does report dizziness that has been present for the past week as well.     - Portions of the above HPI were copied from previous encounters and edited as appropriate. PMH as detailed below.     H&P above reviewed.     History obtained from patient and family at bedside.    Patient states she feels that she has a panic attack yesterday at her sister's . She had a pressure/tension type headache, slurred speech and left side weakness. She has no hx of stroke or high blood pressure. She denies any new vision changes.  When she got to the ER she felt that her left arm and left leg were weak.  She denies any vision changes.  She did mention that she had trouble with her words.  This was a sudden onset without any history of event like this.    No history of stroke or TIA.  Patient relatively healthy and states she only takes vitamins at home daily.  She is back to her baseline today, no  weakness in the left side.        Review of Systems   Constitutional:  Negative for fatigue.   Respiratory: Negative.     Cardiovascular: Negative.    Gastrointestinal:  Negative for nausea and vomiting.   Endocrine: Negative.    Genitourinary: Negative.    Musculoskeletal:  Positive for arthralgias and myalgias.   Skin: Negative.    Allergic/Immunologic: Negative.    Neurological:  Positive for speech difficulty, weakness (left side), numbness (b/l feet) and headaches. Negative for dizziness, tremors, seizures, syncope, facial asymmetry and light-headedness.   Hematological: Negative.    Psychiatric/Behavioral:  Positive for confusion.           PATIENT HISTORY:  History reviewed. No pertinent past medical history., History reviewed. No pertinent surgical history., History reviewed. No pertinent family history.,   Social History     Tobacco Use    Smoking status: Never     Passive exposure: Never    Smokeless tobacco: Never   Vaping Use    Vaping status: Never Used   Substance Use Topics    Alcohol use: Never    Drug use: Never   ,   Prior to Admission medications    Medication Sig Start Date End Date Taking? Authorizing Provider   ALPRAZolam (XANAX) 0.5 MG tablet Take 1 tablet by mouth As Needed for Anxiety.    Provider, MD Mercedes   gabapentin (NEURONTIN) 100 MG capsule Take 1 capsule by mouth 2 (Two) Times a Day.    Provider, MD Mercedes    Allergies:  Ace inhibitors, Amlodipine, Amlodipine besylate, Methylprednisolone, Propoxyphene, Statins, and Sulfa antibiotics    Current Facility-Administered Medications   Medication Dose Route Frequency Provider Last Rate Last Admin    acetaminophen (TYLENOL) tablet 650 mg  650 mg Oral Q4H PRN Karyn Cee APRN        Or    acetaminophen (TYLENOL) 160 MG/5ML oral solution 650 mg  650 mg Oral Q4H PRN Karyn Cee APRN        Or    acetaminophen (TYLENOL) suppository 650 mg  650 mg Rectal Q4H PRN Karyn Cee APRN        Calcium Replacement -  Follow Nurse / BPA Driven Protocol   Not Applicable PRN Jaye, CRISTINA Tavares        gabapentin (NEURONTIN) capsule 100 mg  100 mg Oral BID Jaye, Karyn Marks APRN   100 mg at 06/19/25 0802    hydrALAZINE (APRESOLINE) injection 10 mg  10 mg Intravenous Q6H PRN Jaye, CRISTINA Tavares        Magnesium Standard Dose Replacement - Follow Nurse / BPA Driven Protocol   Not Applicable PRN Jaye, Karyn Marks APRN        melatonin tablet 5 mg  5 mg Oral Nightly PRN Jaye, Karyn Marks APRN        ondansetron (ZOFRAN) injection 4 mg  4 mg Intravenous Q6H PRN Jaye, CRISTINA Tavares        pantoprazole (PROTONIX) EC tablet 40 mg  40 mg Oral Q AM Jaye, Karyn Marks APRN   40 mg at 06/19/25 0802    Phosphorus Replacement - Follow Nurse / BPA Driven Protocol   Not Applicable PRN Jaye, Karyn Marks APRKIRIT        Potassium Replacement - Follow Nurse / BPA Driven Protocol   Not Applicable PRN Jaye, CRISTINA Tavares        sodium chloride 0.9 % flush 10 mL  10 mL Intravenous PRN Missael Fong MD        sodium chloride 0.9 % flush 10 mL  10 mL Intravenous Q12H Jaye, Karyn Marks APRN   10 mL at 06/19/25 0802    sodium chloride 0.9 % flush 10 mL  10 mL Intravenous PRN Jaye, CRISTINA Tavares        sodium chloride 0.9 % infusion 40 mL  40 mL Intravenous PRN Jaye, Karyn Marks APRN            ________________________________________________________        OBJECTIVE:    PHYSICAL EXAM:    Constitutional: The patient is in no apparent distress, bright awake and alert. There is no shortness of breath.     PSYCHIATRIC: Mood/affect normal, judgement normal, appropriate    HEENT:Normocephalic, atraumatic.     Chest: Breathing unlabored    Cardiac: Regular rate and rhythm.     Extremities:  No clubbing, cyanosis or edema.    NEUROLOGICAL:    Cognition:   Fully oriented.  Fund of knowledge excellent.  Concentration and attention normal.   Language normal with normal comprehension, fluent speech, intact repetition and  naming.   Short and long term memory appears intact    Cranial nerves;    II - pupils bilaterally equal reacting to light,  No new Visual field deficits;  Fundoscopic exam- Not able to be done, non-dilated exam  III,IV,VI: EOMI with no diplopia  V: Normal facial sensations  VII: No facial asymmetry,  VIII: No New hearing abnormality  IX, X, XI: normal gag and shoulder shrug;  XII: tongue is in the midline.    Sensory:  Intact to light touch in all extremities.     Motor: Strength 5/5 bilaterally upper and lower extremities. No involuntary movements present. Normal tone and bulk.  Cerebellar: Finger to nose and mirror movements normal bilaterally.    Gait and balance: Deferred.     Physical exam performed by IAN Logan.     ________________________________________________________   RESULTS REVIEW:    VITAL SIGNS:   Temp:  [97.6 °F (36.4 °C)-98.2 °F (36.8 °C)] 98 °F (36.7 °C)  Heart Rate:  [57-72] 61  Resp:  [13-19] 13  BP: (116-194)/(44-96) 154/62     LABS:      Lab 06/19/25  0552 06/18/25  1221   WBC 8.23 8.56   HEMOGLOBIN 12.3 13.5   HEMATOCRIT 38.5 42.1   PLATELETS 221 253   NEUTROS ABS 5.31 5.66   IMMATURE GRANS (ABS) 0.03 0.03   LYMPHS ABS 1.95 1.94   MONOS ABS 0.74 0.76   EOS ABS 0.16 0.12   MCV 91.4 90.9   PROTIME  --  14.2         Lab 06/18/25  2232 06/18/25  1221   SODIUM 140 142   POTASSIUM 4.3 4.4   CHLORIDE 108* 107   CO2 23.2 25.6   ANION GAP 8.8 9.4   BUN 18.5 16.0   CREATININE 1.16* 0.92   EGFR 46.3* 61.1   GLUCOSE 95 94   CALCIUM 8.6 9.2   MAGNESIUM  --  2.3   HEMOGLOBIN A1C  --  5.19   TSH  --  1.980         Lab 06/18/25  1221   TOTAL PROTEIN 6.7   ALBUMIN 4.0   GLOBULIN 2.7   ALT (SGPT) 12   AST (SGOT) 21   BILIRUBIN 0.7   ALK PHOS 89         Lab 06/18/25  1221   PROTIME 14.2   INR 1.10         Lab 06/18/25  1756   CHOLESTEROL 165   LDL CHOL 103*   HDL CHOL 44   TRIGLYCERIDES 98             UA          6/18/2025    18:03   Urinalysis   Squamous Epithelial Cells, UA 0-2    Specific Gravity, UA  1.006    Ketones, UA Negative    Blood, UA Negative    Leukocytes, UA Moderate (2+)    Nitrite, UA Negative    RBC, UA 0-2    WBC, UA 6-10    Bacteria, UA None Seen        Lab Results   Component Value Date    TSH 1.980 06/18/2025     (H) 06/18/2025    HGBA1C 5.19 06/18/2025       IMAGING STUDIES:  MRI Brain Without Contrast  Result Date: 6/18/2025  Impression: No acute intracranial abnormality. Electronically Signed: Anibal Meng MD  6/18/2025 9:33 PM EDT  Workstation ID: [id number]    XR Chest 1 View  Result Date: 6/18/2025  Impression: No active disease. Electronically Signed: Justin Alanis MD  6/18/2025 1:05 PM EDT  Workstation ID: [id number]    CT Head Without Contrast  Result Date: 6/18/2025  Impression: 1. No intracranial hemorrhage. 2. Mild white matter findings most suggestive of chronic microvascular disease. Electronically Signed: Bob Saleh MD  6/18/2025 12:38 PM EDT  Workstation ID: [id number]      I reviewed the patient's new clinical results.    ________________________________________________________     PROBLEM LIST:    Neurologic abnormality    Slurred speech    Acute left-sided weakness    Anxiety    RLS (restless legs syndrome)    History of colon cancer            ASSESSMENT/PLAN:      Hypertensive encephalopathy versus TIA  Blood pressure 195/75 on arrival to ED  MRI brain negative for stroke    CT head: No hemorrhage   MRI brain: reviewed- -no acute/subacute findings   CTA head and neck: reviewed- no significant stenosis or occlusion  Echo: EF is 66 to 70%, no bubble due to age  EKG: SR rate 68   Labs: A1C: 5.19, B12: P, LDL: 103, TSH: 1.980  Antithrombotics:  ASA 81 mg daily for now   Statin:  Listed allergy to statin   - PT/OT/ST as appropriate, Neuro checks per protocol, DVT prophylaxis, Stroke education    2. Anxiety- related to recent death of sister  - Xanax given per primary last night with improvement of symptoms- patient doing better today    3.  Hypertensive emergency, blood  pressure 195/75 in ER  - Improved with hydralazine p.o. as needed  - Continue to monitor      Modification of stroke risk factors:   - Blood pressure should be less than 130/80 outpatient, HbA1c less than 6.5, LDL less than 70; b12>500 and smoking cessation if applicable. We would be grateful if the primary team / primary care physician would keep a close watch on the above targets.  - Stroke education  - Follow up with stroke clinic (referral placed)     Plan      Monitor blood pressure-goal for normotensive  Aspirin 81 mg daily  F/U stroke clinic in 1 month     Discussed MRI results and plan of care with patient, family, and nursing staff.     I discussed the patient's findings and my recommendations with patient, family, and nursing staff    CRISTINA Cunningham  06/19/25  09:39 EDT

## 2025-06-19 NOTE — PAYOR COMM NOTE
"Carlos Holliday (84 y.o. Female)       Date of Birth   1940    Social Security Number       Address   [address] [place] [facility]    Home Phone   [phone]    MRN   [id number]       Scientologist   None    Marital Status                               Admission Date   6/18/2025    Admission Type   Emergency    Admitting Provider   Lulu Emmanuel MD    Attending Provider   Lulu Emmanuel MD    Department, Room/Bed   [facility] NEURO, 240/1       Discharge Date       Discharge Disposition       Discharge Destination                                 Attending Provider: Lulu Emmanuel MD    Allergies: Ace Inhibitors, Amlodipine, Amlodipine Besylate, Methylprednisolone, Propoxyphene, Statins, Sulfa Antibiotics    Isolation: None   Infection: None   Code Status: CPR    Ht: 160 cm (63\")   Wt: 72.3 kg (159 lb 6.3 oz)    Admission Cmt: None   Principal Problem: Neurologic abnormality [R29.818]                   Active Insurance as of 6/18/2025       Primary Coverage       Payor Plan Insurance Group Employer/Plan Group    [facility] MEDICARE REPLACEMENT AARP MEDICARE ADVANTAGE PPO [id number]       Payor Plan Address Payor Plan Phone Number Payor Plan Fax Number Effective Dates    [address]   2/1/2025 - None Entered    [facility] [id number]         Subscriber Name Subscriber Birth Date Member ID       CARLOS HOLLIDAY 1940 [id number]                     Emergency Contacts        (Rel.) Home Phone Work Phone Mobile Phone    LENA JOLLY (Daughter) [phone] -- [phone]                 History & Physical        JayeKaryn APRN at 06/18/25 1457       Attestation signed by Lulu Emmanuel MD at 06/18/25 1905      I have reviewed this documentation and agree.                          Patient Care Team:  Provider, No Known as PCP - General    Chief complaint neurologic changes    Subjective    Carlos Holliday is an 84-year-old female who presents to [facility] ED on 2025 after a week progression of neurologic changes.  Patient has a past medical history of colon cancer.  She overall takes no prescribed medication.  Patient reports she was at her sisters  when she developed severe head pain describing it as a band circling the top of her head.  She was having trouble getting her words out.  She had her daughters take her outside and sit in a car in front of the air conditioner.  Patient reports this head pain\pressure has been present for the past week coming and going.  She does report slurred speech but this has improved.  Patient also had significant left sided weakness which has also improved.  Does report dizziness that has been present for the past week as well.    Review of Systems   Constitutional:  Positive for activity change and appetite change.   HENT: Negative.     Respiratory: Negative.     Cardiovascular: Negative.    Gastrointestinal: Negative.    Genitourinary: Negative.    Neurological:  Positive for dizziness, speech difficulty, weakness and light-headedness.   Psychiatric/Behavioral: Negative.            History  No past medical history on file.  No past surgical history on file.  No family history on file.     (Not in a hospital admission)    Allergies:  Ace inhibitors, Amlodipine, Amlodipine besylate, Methylprednisolone, Propoxyphene, Statins, and Sulfa antibiotics    Objective    Vital Signs  Temp:  [97.9 °F (36.6 °C)] 97.9 °F (36.6 °C)  Heart Rate:  [65-72] 65  Resp:  [18] 18  BP: (161-194)/(67-75) 163/73       Physical Exam  Vitals reviewed.   HENT:      Head: Normocephalic and atraumatic.      Right Ear: External ear normal.      Left Ear: External ear normal.      Nose: Nose normal.   Eyes:      General:         Right eye: No discharge.         Left eye: No discharge.   Cardiovascular:      Rate and Rhythm: Normal rate.   Pulmonary:      Effort: Pulmonary effort is normal.   Abdominal:      General: Bowel sounds are normal.       Palpations: Abdomen is soft.   Skin:     General: Skin is warm and dry.      Comments: Left lower extremity weakness   Neurological:      Mental Status: She is alert and oriented to person, place, and time.   Psychiatric:         Behavior: Behavior normal.          Results Review:     Imaging Results (Last 24 Hours)       Procedure Component Value Units Date/Time    XR Chest 1 View [[id number]] Collected: 06/18/25 1300     Updated: 06/18/25 1307    Narrative:      XR CHEST 1 VW    Date of Exam: 6/18/2025 12:33 PM EDT    Indication: Acute cerebrovascular accident, speech difficulties.    Comparison: None available.    Findings:  The heart size is normal. The pulmonary vascular markings are normal. The lungs and pleural spaces are clear of active disease. There are chronic age-related changes involving the bony thorax and thoracic aorta.      Impression:      Impression:  No active disease.        Electronically Signed: Justin Alanis MD    6/18/2025 1:05 PM EDT    Workstation ID: [id number]    CT Head Without Contrast Stroke Protocol [[id number]] Collected: 06/18/25 1235     Updated: 06/18/25 1240    Narrative:      CT HEAD WO CONTRAST STROKE PROTOCOL    Date of Exam: 6/18/2025 12:20 PM EDT    Indication: Stroke, follow up    Comparison: None available.    Technique: Axial CT images were obtained of the head without contrast administration.  Reconstructed coronal and sagittal images were also obtained. Automated exposure control and iterative construction methods were used.    Exam was not a code stroke exam per the CT technologist.    Findings:  No intracranial hemorrhage. No mass effect or midline shift. There are few mild areas of periventricular and subcortical white matter hypoattenuation most suggestive of chronic microvascular disease. The posterior fossa is without acute abnormality. No   intraventricular hemorrhage. Globes symmetric. No retro-orbital abnormality. The mastoid air cells are well-aerated. There  is no sinus fluid level. No calvarial fracture.      Impression:      Impression:  1. No intracranial hemorrhage.  2. Mild white matter findings most suggestive of chronic microvascular disease.            Electronically Signed: Bob Saleh MD    6/18/2025 12:38 PM EDT    Workstation ID: [id number]             Lab Results (last 24 hours)       Procedure Component Value Units Date/Time    Comprehensive Metabolic Panel [[id number]] Collected: 06/18/25 1221    Specimen: Blood Updated: 06/18/25 1256     Glucose 94 mg/dL      BUN 16.0 mg/dL      Creatinine 0.92 mg/dL      Sodium 142 mmol/L      Potassium 4.4 mmol/L      Chloride 107 mmol/L      CO2 25.6 mmol/L      Calcium 9.2 mg/dL      Total Protein 6.7 g/dL      Albumin 4.0 g/dL      ALT (SGPT) 12 U/L      AST (SGOT) 21 U/L      Alkaline Phosphatase 89 U/L      Total Bilirubin 0.7 mg/dL      Globulin 2.7 gm/dL      A/G Ratio 1.5 g/dL      BUN/Creatinine Ratio 17.4     Anion Gap 9.4 mmol/L      eGFR 61.1 mL/min/1.73     Narrative:      GFR Categories in Chronic Kidney Disease (CKD)              GFR Category          GFR (mL/min/1.73)    Interpretation  G1                    90 or greater        Normal or high (1)  G2                    60-89                Mild decrease (1)  G3a                   45-59                Mild to moderate decrease  G3b                   30-44                Moderate to severe decrease  G4                    15-29                Severe decrease  G5                    14 or less           Kidney failure    (1)In the absence of evidence of kidney disease, neither GFR category G1 or G2 fulfill the criteria for CKD.    eGFR calculation 2021 CKD-EPI creatinine equation, which does not include race as a factor    Protime-INR [[id number]]  (Normal) Collected: 06/18/25 1221    Specimen: Blood Updated: 06/18/25 1237     Protime 14.2 Seconds      INR 1.10    [place] Draw [[id number]] Collected: 06/18/25 1221    Specimen: Blood Updated: 06/18/25 1231     Narrative:      The following orders were created for panel order [place] Draw.  Procedure                               Abnormality         Status                     ---------                               -----------         ------                     Green Top (Gel)[[id number]]                                  Final result               Lavender Top[[id number]]                                     Final result               Gold Top - SST[[id number]]                                   Final result               Light Blue Top[[id number]]                                   Final result                 Please view results for these tests on the individual orders.    Green Top (Gel) [[id number]] Collected: 06/18/25 1221    Specimen: Blood Updated: 06/18/25 1231     Extra Tube Hold for add-ons.     Comment: Auto resulted.       Lavender Top [[id number]] Collected: 06/18/25 1221    Specimen: Blood Updated: 06/18/25 1231     Extra Tube hold for add-on     Comment: Auto resulted       Gold Top - SST [[id number]] Collected: 06/18/25 1221    Specimen: Blood Updated: 06/18/25 1231     Extra Tube Hold for add-ons.     Comment: Auto resulted.       Light Blue Top [[id number]] Collected: 06/18/25 1221    Specimen: Blood Updated: 06/18/25 1231     Extra Tube Hold for add-ons.     Comment: Auto resulted       CBC & Differential [[id number]]  (Normal) Collected: 06/18/25 1221    Specimen: Blood Updated: 06/18/25 1227    Narrative:      The following orders were created for panel order CBC & Differential.  Procedure                               Abnormality         Status                     ---------                               -----------         ------                     CBC Auto Differential[[id number]]        Normal              Final result                 Please view results for these tests on the individual orders.    CBC Auto Differential [[id number]]  (Normal) Collected: 06/18/25 1221    Specimen: Blood Updated: 06/18/25 1227      WBC 8.56 10*3/mm3      RBC 4.63 10*6/mm3      Hemoglobin 13.5 g/dL      Hematocrit 42.1 %      MCV 90.9 fL      MCH 29.2 pg      MCHC 32.1 g/dL      RDW 13.2 %      RDW-SD 44.0 fl      MPV 10.3 fL      Platelets 253 10*3/mm3      Neutrophil % 66.0 %      Lymphocyte % 22.7 %      Monocyte % 8.9 %      Eosinophil % 1.4 %      Basophil % 0.6 %      Immature Grans % 0.4 %      Neutrophils, Absolute 5.66 10*3/mm3      Lymphocytes, Absolute 1.94 10*3/mm3      Monocytes, Absolute 0.76 10*3/mm3      Eosinophils, Absolute 0.12 10*3/mm3      Basophils, Absolute 0.05 10*3/mm3      Immature Grans, Absolute 0.03 10*3/mm3      nRBC 0.0 /100 WBC              I reviewed the patient's new clinical results.    Assessment & Plan      Slurred speech    Acute left-sided weakness    Anxiety    RLS (restless legs syndrome)    History of colon cancer    - Appreciate neurology input  - Will check UA, magnesium, TSH\Free T4, hemoglobin A1c, lipid panel  - CT head shows no acute intracranial change, will order MRI and bilateral carotid ultrasound  -BP control  - PT\OT  - Continue home medications for chronic conditions    Diet: N.p.o.  DVT prophylaxis: SCDs  GI prophylaxis: Protonix    CODE STATUS:  Code status (Patient has no pulse and is not breathing):   Medical Interventions (Patient has pulse or is breathing):  Level of Support Discussed with : Full code    Admission Status: Patient meets observation status    Expected length of Stay: 1-2 midnight stays      I discussed the patient's findings and my recommendations with patient.     CRISTINA Clark  06/18/25  15:04 EDT        Electronically signed by Lulu Emmanuel MD at 06/18/25 1909          Emergency Department Notes        Missael Fong MD at 06/18/25 1336          Subjective   History of Present Illness  Chief complaint slurred speech dragging left leg    History of present illness 84-year-old female complains of slurred speech and dragging left leg starting last Tuesday which  was a week ago.  She gets this, intermittent shaking as well.  This occurs over her whole body.  There is no headache or visual changes.  She has been somewhat dizzy and hanging onto things when she tries to walk.  There is no chest pain neck arm jaw pain or shortness of breath no recent injury illness flus viruses vaccinations foreign travels antibiotic use or hospitalization.  She states that this started last Tuesday when she was at her sister's .  Nothing otherwise seems to make it better or worse and there is no pain.      Review of Systems   Constitutional:  Negative for chills and fever.   Respiratory:  Negative for chest tightness and shortness of breath.    Cardiovascular:  Negative for chest pain.   Gastrointestinal:  Negative for abdominal pain and vomiting.   Genitourinary:  Negative for difficulty urinating and dysuria.   Musculoskeletal:  Negative for neck pain.   Skin:  Negative for rash.   Neurological:  Positive for speech difficulty and weakness.   Psychiatric/Behavioral:  Negative for confusion.        No past medical history on file.    Allergies   Allergen Reactions    Ace Inhibitors Hives     ha    Amlodipine Swelling    Amlodipine Besylate Swelling    Methylprednisolone Hives    Propoxyphene Swelling    Statins Nausea And Vomiting and Other (See Comments)     nervous    Sulfa Antibiotics Hives     Age 17       No past surgical history on file.    No family history on file.    Social History     Socioeconomic History    Marital status:    No tobacco alcohol or drug    Prior to Admission medications    Medication Sig Start Date End Date Taking? Authorizing Provider   ALPRAZolam (XANAX) 0.5 MG tablet Take 1 tablet by mouth As Needed for Anxiety.    ProviderMercedes MD   gabapentin (NEURONTIN) 100 MG capsule Take 1 capsule by mouth 2 (Two) Times a Day.    Provider, MD Mercedes        Objective   Physical Exam  Constitutional this is an 84-year-old awake alert in no acute  distress triage vital signs reviewed.  HEENT extraocular muscles are intact pupils equal round react there is no photophobia or papilledema the mouth is clear.  Neck supple no adenopathy no JVD no bruits no meningeal signs lungs were clear no retraction no use of accessories.  Heart regular without murmur rub abdomen was soft nontender good bowel sounds no peritoneal findings or pulsatile masses extremities pulses equal upper and lower extremities no edema no cords no Homans' sign no evidence of DVT skin is warm and dry without rashes or cellulitic changes neurologic awake alert orientated x 3 isgood symmetry speech seems okay currently to me.  There is no drift in the arms she does have a slight drift in the left leg.  Normal finger-to-nose normal heel shin no clonus is noted no extinction peripheral vision is intact she is able to identify objects and read  without difficulty.  NIH of 1 currently  Procedures          ED Course      Results for orders placed or performed during the hospital encounter of 06/18/25   ECG 12 Lead ED Triage Standing Order; Stroke (Onset >12 hrs)    Collection Time: 06/18/25 12:06 PM   Result Value Ref Range    QT Interval 406 ms    QTC Interval 434 ms   Comprehensive Metabolic Panel    Collection Time: 06/18/25 12:21 PM    Specimen: Blood   Result Value Ref Range    Glucose 94 65 - 99 mg/dL    BUN 16.0 8.0 - 23.0 mg/dL    Creatinine 0.92 0.57 - 1.00 mg/dL    Sodium 142 136 - 145 mmol/L    Potassium 4.4 3.5 - 5.2 mmol/L    Chloride 107 98 - 107 mmol/L    CO2 25.6 22.0 - 29.0 mmol/L    Calcium 9.2 8.6 - 10.5 mg/dL    Total Protein 6.7 6.0 - 8.5 g/dL    Albumin 4.0 3.5 - 5.2 g/dL    ALT (SGPT) 12 1 - 33 U/L    AST (SGOT) 21 1 - 32 U/L    Alkaline Phosphatase 89 39 - 117 U/L    Total Bilirubin 0.7 0.0 - 1.2 mg/dL    Globulin 2.7 gm/dL    A/G Ratio 1.5 g/dL    BUN/Creatinine Ratio 17.4 7.0 - 25.0    Anion Gap 9.4 5.0 - 15.0 mmol/L    eGFR 61.1 >60.0 mL/min/1.73   Protime-INR    Collection  Time: 06/18/25 12:21 PM    Specimen: Blood   Result Value Ref Range    Protime 14.2 11.7 - 14.2 Seconds    INR 1.10 0.90 - 1.10   CBC Auto Differential    Collection Time: 06/18/25 12:21 PM    Specimen: Blood   Result Value Ref Range    WBC 8.56 3.40 - 10.80 10*3/mm3    RBC 4.63 3.77 - 5.28 10*6/mm3    Hemoglobin 13.5 12.0 - 15.9 g/dL    Hematocrit 42.1 34.0 - 46.6 %    MCV 90.9 79.0 - 97.0 fL    MCH 29.2 26.6 - 33.0 pg    MCHC 32.1 31.5 - 35.7 g/dL    RDW 13.2 12.3 - 15.4 %    RDW-SD 44.0 37.0 - 54.0 fl    MPV 10.3 6.0 - 12.0 fL    Platelets 253 140 - 450 10*3/mm3    Neutrophil % 66.0 42.7 - 76.0 %    Lymphocyte % 22.7 19.6 - 45.3 %    Monocyte % 8.9 5.0 - 12.0 %    Eosinophil % 1.4 0.3 - 6.2 %    Basophil % 0.6 0.0 - 1.5 %    Immature Grans % 0.4 0.0 - 0.5 %    Neutrophils, Absolute 5.66 1.70 - 7.00 10*3/mm3    Lymphocytes, Absolute 1.94 0.70 - 3.10 10*3/mm3    Monocytes, Absolute 0.76 0.10 - 0.90 10*3/mm3    Eosinophils, Absolute 0.12 0.00 - 0.40 10*3/mm3    Basophils, Absolute 0.05 0.00 - 0.20 10*3/mm3    Immature Grans, Absolute 0.03 0.00 - 0.05 10*3/mm3    nRBC 0.0 0.0 - 0.2 /100 WBC   Green Top (Gel)    Collection Time: 06/18/25 12:21 PM   Result Value Ref Range    Extra Tube Hold for add-ons.    Lavender Top    Collection Time: 06/18/25 12:21 PM   Result Value Ref Range    Extra Tube hold for add-on    Gold Top - SST    Collection Time: 06/18/25 12:21 PM   Result Value Ref Range    Extra Tube Hold for add-ons.    Light Blue Top    Collection Time: 06/18/25 12:21 PM   Result Value Ref Range    Extra Tube Hold for add-ons.      XR Chest 1 View  Result Date: 6/18/2025  Impression: No active disease. Electronically Signed: Justin Alanis MD  6/18/2025 1:05 PM EDT  Workstation ID: [id number]    CT Head Without Contrast Stroke Protocol  Result Date: 6/18/2025  Impression: 1. No intracranial hemorrhage. 2. Mild white matter findings most suggestive of chronic microvascular disease. Electronically Signed: Bob Saleh MD   6/18/2025 12:38 PM EDT  Workstation ID: [id number]    Medications   sodium chloride 0.9 % flush 10 mL (has no administration in time range)   aspirin chewable tablet 324 mg (has no administration in time range)       EKG my interpretation normal sinus rhythm rate of 68 normal axis no hypertrophy QTc of 434 normal EKG nothing old to compare                          Total (NIH Stroke Scale): 1                      Medical Decision Making  Medical Decision Making.  IV established monitor placed my review of sinus rhythm EKG my interpretation normal sinus rhythm at 68 normal axis hypertrophy QTc of 434 normal EKG with nothing old to compare with.  Chest x-ray my independent review no pneumonia pneumothorax failure acute findings radiology review unremarkable CT head without nondependent view I do not see any tumors masses hemorrhage or acute findings.  Radiology finding no acute findings.  Labs obtained my independent review comprehensive metabolic profile unremarkable coags and CBC unremarkable.  Patient's exam resting comfortably still with NIH of 1 she does get these periodic sort of generalized body tremors she is awake and conscious no generalized tonic-clonic seizure activity.  I do not see any evidence of acute cerebral hemorrhage or carotid tumor or dissection or meningitis encephalitis based on my history and physical and clinical findings although not a complete list of all possibilities.  I would suspect a stroke she will need further workup with MRI and CT angiograms and further working up.  I talked to patient about this.  And family made aware of the findings.  I talked to the nurse practitioner for Dr. Wagner indication to suggest the patient be admitted to hospital for further care stable otherwise unremarkable course.  Patient is not any candidate for thrombolytic intervention as she is a week out from this.    Problems Addressed:  Acute CVA (cerebrovascular accident): complicated acute illness or  injury    Amount and/or Complexity of Data Reviewed  Labs: ordered. Decision-making details documented in ED Course.  Radiology: ordered and independent interpretation performed. Decision-making details documented in ED Course.  ECG/medicine tests: ordered and independent interpretation performed. Decision-making details documented in ED Course.    Risk  Decision regarding hospitalization.        Final diagnoses:   Acute CVA (cerebrovascular accident)       ED Disposition  ED Disposition       ED Disposition   Decision to Admit    Condition   --    Comment   Level of Care: Telemetry [5]   Admitting Physician: LACHO EMMANUEL [[id number]]   Attending Physician: LACHO EMMANUEL [[id number]]   Bed Request Comments: thao                 No follow-up provider specified.       Medication List      No changes were made to your prescriptions during this visit.            Missael Fong MD  06/18/25 1434       Missael Fong MD  06/18/25 1530      Electronically signed by Missael Fong MD at 06/18/25 1530       Operative/Procedure Notes (last 48 hours)  Notes from 06/17/25 1225 through 06/19/25 1225   No notes of this type exist for this encounter.       Physician Progress Notes (last 48 hours)  Notes from 06/17/25 1225 through 06/19/25 1225   No notes of this type exist for this encounter.          Consult Notes (last 48 hours)        Rebeka Garcia APRN at 06/19/25 0939        Consult Orders    1. Inpatient Neurology Consult Stroke [[id number]] ordered by Karyn Cee APRN at 06/18/25 1459                 Primary Care Provider: Provider, No Known     Consult requested by: Dr. Emmanuel     Reason for Consultation: Neurological evaluation, Stroke     History taken from: patient chart RN    Chief complaint: headache, left side weakness, slurred speech     Subjective   SUBJECTIVE:    History of present illness: Background per H&P: Pura Alexanedr is a 84 y.o. female who presents to [facility] ED on 6/18/2025 after a  week progression of neurologic changes. Patient has a past medical history of colon cancer. She overall takes no prescribed medication. Patient reports she was at her sisters  when she developed severe head pain describing it as a band circling the top of her head. She was having trouble getting her words out. She had her daughters take her outside and sit in a car in front of the air conditioner. Patient reports this head pain\pressure has been present for the past week coming and going. She does report slurred speech but this has improved. Patient also had significant left sided weakness which has also improved. Does report dizziness that has been present for the past week as well.     - Portions of the above HPI were copied from previous encounters and edited as appropriate. PMH as detailed below.     H&P above reviewed.     History obtained from patient and family at bedside.    Patient states she feels that she has a panic attack yesterday at her sister's . She had a pressure/tension type headache, slurred speech and left side weakness. She has no hx of stroke or high blood pressure. She denies any new vision changes.  When she got to the ER she felt that her left arm and left leg were weak.  She denies any vision changes.  She did mention that she had trouble with her words.  This was a sudden onset without any history of event like this.    No history of stroke or TIA.  Patient relatively healthy and states she only takes vitamins at home daily.  She is back to her baseline today, no weakness in the left side.        Review of Systems   Constitutional:  Negative for fatigue.   Respiratory: Negative.     Cardiovascular: Negative.    Gastrointestinal:  Negative for nausea and vomiting.   Endocrine: Negative.    Genitourinary: Negative.    Musculoskeletal:  Positive for arthralgias and myalgias.   Skin: Negative.    Allergic/Immunologic: Negative.    Neurological:  Positive for speech difficulty,  weakness (left side), numbness (b/l feet) and headaches. Negative for dizziness, tremors, seizures, syncope, facial asymmetry and light-headedness.   Hematological: Negative.    Psychiatric/Behavioral:  Positive for confusion.           PATIENT HISTORY:  History reviewed. No pertinent past medical history., History reviewed. No pertinent surgical history., History reviewed. No pertinent family history.,   Social History     Tobacco Use    Smoking status: Never     Passive exposure: Never    Smokeless tobacco: Never   Vaping Use    Vaping status: Never Used   Substance Use Topics    Alcohol use: Never    Drug use: Never   ,   Prior to Admission medications    Medication Sig Start Date End Date Taking? Authorizing Provider   ALPRAZolam (XANAX) 0.5 MG tablet Take 1 tablet by mouth As Needed for Anxiety.    ProviderMercedes MD   gabapentin (NEURONTIN) 100 MG capsule Take 1 capsule by mouth 2 (Two) Times a Day.    Provider, MD Mercedes    Allergies:  Ace inhibitors, Amlodipine, Amlodipine besylate, Methylprednisolone, Propoxyphene, Statins, and Sulfa antibiotics    Current Facility-Administered Medications   Medication Dose Route Frequency Provider Last Rate Last Admin    acetaminophen (TYLENOL) tablet 650 mg  650 mg Oral Q4H PRN Karyn Cee APRN        Or    acetaminophen (TYLENOL) 160 MG/5ML oral solution 650 mg  650 mg Oral Q4H PRN Karyn Cee APRN        Or    acetaminophen (TYLENOL) suppository 650 mg  650 mg Rectal Q4H PRN JayeKaryn godoy APRKIRIT        Calcium Replacement - Follow Nurse / BPA Driven Protocol   Not Applicable PRN JayeKaryn APRN        gabapentin (NEURONTIN) capsule 100 mg  100 mg Oral BID Karyn Cee APRN   100 mg at 06/19/25 0802    hydrALAZINE (APRESOLINE) injection 10 mg  10 mg Intravenous Q6H PRN Karyn Cee APRN        Magnesium Standard Dose Replacement - Follow Nurse / BPA Driven Protocol   Not Applicable PRN JayeKaryn APRN         melatonin tablet 5 mg  5 mg Oral Nightly PRN Jaye, CRISTINA Tavares        ondansetron (ZOFRAN) injection 4 mg  4 mg Intravenous Q6H PRN Jaye, CRISTINA Tavares        pantoprazole (PROTONIX) EC tablet 40 mg  40 mg Oral Q AM Jaye, Karyn Marks APRN   40 mg at 06/19/25 0802    Phosphorus Replacement - Follow Nurse / BPA Driven Protocol   Not Applicable PRN Jaye, CRISTINA Tavares        Potassium Replacement - Follow Nurse / BPA Driven Protocol   Not Applicable PRN Jaye, CRISTINA Tavares        sodium chloride 0.9 % flush 10 mL  10 mL Intravenous PRN Missael Fong MD        sodium chloride 0.9 % flush 10 mL  10 mL Intravenous Q12H Jaye, Karyn Marks APRN   10 mL at 06/19/25 0802    sodium chloride 0.9 % flush 10 mL  10 mL Intravenous PRN Jaye, CRISTINA Tavares        sodium chloride 0.9 % infusion 40 mL  40 mL Intravenous PRN Jaye, CRISTINA Tavares            ________________________________________________________     Objective   OBJECTIVE:    PHYSICAL EXAM:    Constitutional: The patient is in no apparent distress, bright awake and alert. There is no shortness of breath.     PSYCHIATRIC: Mood/affect normal, judgement normal, appropriate    HEENT:Normocephalic, atraumatic.     Chest: Breathing unlabored    Cardiac: Regular rate and rhythm.     Extremities:  No clubbing, cyanosis or edema.    NEUROLOGICAL:    Cognition:   Fully oriented.  Fund of knowledge excellent.  Concentration and attention normal.   Language normal with normal comprehension, fluent speech, intact repetition and naming.   Short and long term memory appears intact    Cranial nerves;    II - pupils bilaterally equal reacting to light,  No new Visual field deficits;  Fundoscopic exam- Not able to be done, non-dilated exam  III,IV,VI: EOMI with no diplopia  V: Normal facial sensations  VII: No facial asymmetry,  VIII: No New hearing abnormality  IX, X, XI: normal gag and shoulder shrug;  XII: tongue is in the  midline.    Sensory:  Intact to light touch in all extremities.     Motor: Strength 5/5 bilaterally upper and lower extremities. No involuntary movements present. Normal tone and bulk.  Cerebellar: Finger to nose and mirror movements normal bilaterally.    Gait and balance: Deferred.     Physical exam performed by IAN Logan.     ________________________________________________________   RESULTS REVIEW:    VITAL SIGNS:   Temp:  [97.6 °F (36.4 °C)-98.2 °F (36.8 °C)] 98 °F (36.7 °C)  Heart Rate:  [57-72] 61  Resp:  [13-19] 13  BP: (116-194)/(44-96) 154/62     LABS:      Lab 06/19/25  0552 06/18/25  1221   WBC 8.23 8.56   HEMOGLOBIN 12.3 13.5   HEMATOCRIT 38.5 42.1   PLATELETS 221 253   NEUTROS ABS 5.31 5.66   IMMATURE GRANS (ABS) 0.03 0.03   LYMPHS ABS 1.95 1.94   MONOS ABS 0.74 0.76   EOS ABS 0.16 0.12   MCV 91.4 90.9   PROTIME  --  14.2         Lab 06/18/25  2232 06/18/25  1221   SODIUM 140 142   POTASSIUM 4.3 4.4   CHLORIDE 108* 107   CO2 23.2 25.6   ANION GAP 8.8 9.4   BUN 18.5 16.0   CREATININE 1.16* 0.92   EGFR 46.3* 61.1   GLUCOSE 95 94   CALCIUM 8.6 9.2   MAGNESIUM  --  2.3   HEMOGLOBIN A1C  --  5.19   TSH  --  1.980         Lab 06/18/25  1221   TOTAL PROTEIN 6.7   ALBUMIN 4.0   GLOBULIN 2.7   ALT (SGPT) 12   AST (SGOT) 21   BILIRUBIN 0.7   ALK PHOS 89         Lab 06/18/25  1221   PROTIME 14.2   INR 1.10         Lab 06/18/25  1756   CHOLESTEROL 165   LDL CHOL 103*   HDL CHOL 44   TRIGLYCERIDES 98             UA          6/18/2025    18:03   Urinalysis   Squamous Epithelial Cells, UA 0-2    Specific Gravity, UA 1.006    Ketones, UA Negative    Blood, UA Negative    Leukocytes, UA Moderate (2+)    Nitrite, UA Negative    RBC, UA 0-2    WBC, UA 6-10    Bacteria, UA None Seen        Lab Results   Component Value Date    TSH 1.980 06/18/2025     (H) 06/18/2025    HGBA1C 5.19 06/18/2025       IMAGING STUDIES:  MRI Brain Without Contrast  Result Date: 6/18/2025  Impression: No acute intracranial  abnormality. Electronically Signed: Anibal Meng MD  6/18/2025 9:33 PM EDT  Workstation ID: [id number]    XR Chest 1 View  Result Date: 6/18/2025  Impression: No active disease. Electronically Signed: Justin Alanis MD  6/18/2025 1:05 PM EDT  Workstation ID: [id number]    CT Head Without Contrast  Result Date: 6/18/2025  Impression: 1. No intracranial hemorrhage. 2. Mild white matter findings most suggestive of chronic microvascular disease. Electronically Signed: Bob Saleh MD  6/18/2025 12:38 PM EDT  Workstation ID: [id number]      I reviewed the patient's new clinical results.    ________________________________________________________     PROBLEM LIST:    Neurologic abnormality    Slurred speech    Acute left-sided weakness    Anxiety    RLS (restless legs syndrome)    History of colon cancer            ASSESSMENT/PLAN:      Hypertensive encephalopathy versus TIA  Blood pressure 195/75 on arrival to ED  MRI brain negative for stroke    CT head: No hemorrhage   MRI brain: reviewed- -no acute/subacute findings   CTA head and neck: pending   Echo: pending   EKG: SR rate 68   Labs: A1C: 5.19, B12: P, LDL: 103, TSH: 1.980  Antithrombotics:  ASA 81 mg daily for now   Statin:  Listed allergy to statin   - PT/OT/ST as appropriate, Neuro checks per protocol, DVT prophylaxis, Stroke education    2. Anxiety- related to recent death of sister  - Xanax given per primary last night with improvement of symptoms- patient doing better today    3.  Hypertensive emergency, blood pressure 195/75 in ER  - Improved with hydralazine p.o. as needed  - Continue to monitor      Modification of stroke risk factors:   - Blood pressure should be less than 130/80 outpatient, HbA1c less than 6.5, LDL less than 70; b12>500 and smoking cessation if applicable. We would be grateful if the primary team / primary care physician would keep a close watch on the above targets.  - Stroke education  - Follow up with stroke clinic (referral placed)      Plan    CTA head and neck for TIA workup (left hemiparesis)  Echo pending read-  Monitor blood pressure-goal for normotensive  Aspirin 81 mg daily    Discussed MRI results and plan of care with patient, family, and nursing staff.     I discussed the patient's findings and my recommendations with patient, family, and nursing staff    CRISTINA Cunningham  06/19/25  09:39 EDT          Electronically signed by Rebeka Garcia APRN at 06/19/25 6810

## 2025-06-19 NOTE — CASE MANAGEMENT/SOCIAL WORK
Discharge Planning Assessment   [facility]     Patient Name: Pura Alexander  MRN: [id number]  Today's Date: 6/19/2025    Admit Date: 6/18/2025    Plan: D/C Plan: Home with dtr, and [facility] (Only H.H agency who has contract with ins.). Transport home by family car.   Discharge Needs Assessment       Row Name 06/19/25 1112       Living Environment    People in Home child(stephanie), adult    Name(s) of People in Home Amanda-dtr    Current Living Arrangements home    Potentially Unsafe Housing Conditions none    In the past 12 months has the electric, gas, oil, or water company threatened to shut off services in your home? No    Primary Care Provided by self    Provides Primary Care For no one    Family Caregiver if Needed child(stephanie), adult    Family Caregiver Names Amanda    Quality of Family Relationships helpful;involved;supportive    Able to Return to Prior Arrangements yes       Resource/Environmental Concerns    Resource/Environmental Concerns none    Transportation Concerns none       Transportation Needs    In the past 12 months, has lack of transportation kept you from medical appointments or from getting medications? no    In the past 12 months, has lack of transportation kept you from meetings, work, or from getting things needed for daily living? No       Food Insecurity    Within the past 12 months, you worried that your food would run out before you got the money to buy more. Never true    Within the past 12 months, the food you bought just didn't last and you didn't have money to get more. Never true       Transition Planning    Patient/Family Anticipates Transition to home with family    Patient/Family Anticipated Services at Transition home health care    Transportation Anticipated family or friend will provide       Discharge Needs Assessment    Readmission Within the Last 30 Days no previous admission in last 30 days    Equipment Currently Used at Home rollator    Concerns to be  Addressed discharge planning    Do you want help finding or keeping work or a job? I do not need or want help    Do you want help with school or training? For example, starting or completing job training or getting a high school diploma, GED or equivalent No    Anticipated Changes Related to Illness none    Equipment Needed After Discharge none    Outpatient/Agency/Support Group Needs homecare agency    Discharge Facility/Level of Care Needs home with home health    Patient's Choice of Community Agency(s) Only local vendor who will consider [facility]                   Discharge Plan       Row Name 06/19/25 1114       Plan    Plan D/C Plan: Home with dtr, and H.H if accepted by [facility]. (Only H.H agency who has contract with ins.). Transport home by family car.    Patient/Family in Agreement with Plan yes    Plan Comments Met with dtr and g-dtr at bedside. Verified PCP and Pharm.  No difficulties paying for medications. Intersted in M2B's.  Placed in EPIC.  Reviewed VEGA with dtr; signed and copy given.  Pt lives with her dtr at a mod. ind. level. She uses a rollator for mobility.  Dtr takes care of household chores. Therapy has recommended H.H. Referral made to [facility] which is only local H.H that has contract with [facility]  Pt is in agreeement if it is pd for by ins.  No other needs expressed or identified.  Barrier to d/c: Medical and Neuro clearance.                  Continued Care and Services - Admitted Since 6/18/2025       Home Medical Care       Service Provider Request Status Services Address Phone Fax Patient Preferred    [facility] Pending - No Request Sent -- [address] [place] [address] [phone] --                     Demographic Summary       Row Name 06/19/25 1111       General Information    Admission Type observation    Arrived From emergency department    Required Notices Provided Observation Status Notice    Referral Source  admission list    Reason for Consult discharge planning    Preferred Language English                   Functional Status       Row Name 06/19/25 1111       Functional Status    Usual Activity Tolerance good    Current Activity Tolerance moderate       Functional Status, IADL    Medications independent    Meal Preparation assistive person    Housekeeping assistive person    Laundry assistive person    Shopping assistive person    If for any reason you need help with day-to-day activities such as bathing, preparing meals, shopping, managing finances, etc., do you get the help you need? I get all the help I need       Mental Status    General Appearance WDL WDL       Mental Status Summary    Recent Changes in Mental Status/Cognitive Functioning no changes       Employment/    Employment Status retired    Current or Previous Occupation not applicable                          Carlee Raymond RN  RN/.  Office Ph. [phone]  Cell Ph.  [phone]

## 2025-06-19 NOTE — THERAPY EVALUATION
Patient Name: Pura Alexander  : 1940    MRN: [id number]                              Today's Date: 2025       Admit Date: 2025    Visit Dx:     ICD-10-CM ICD-9-CM   1. Acute CVA (cerebrovascular accident)  I63.9 434.91   2. TIA (transient ischemic attack)  G45.9 435.9     Patient Active Problem List   Diagnosis    Slurred speech    Acute left-sided weakness    Anxiety    RLS (restless legs syndrome)    History of colon cancer    Neurologic abnormality     History reviewed. No pertinent past medical history.  History reviewed. No pertinent surgical history.   General Information       Row Name 25 1423          Physical Therapy Time and Intention    Document Type evaluation  -BR     Mode of Treatment physical therapy  -BR       Row Name 25 1423          General Information    Patient Profile Reviewed yes  -BR     Prior Level of Function independent:;all household mobility;gait;transfer  Pt 'furniture walks' in her home. She has a rollator to use as needed.  -BR     Existing Precautions/Restrictions fall  -BR     Barriers to Rehab none identified  -BR       Row Name 25 1423          Living Environment    Current Living Arrangements home  -BR     People in Home child(stephanie), adult;other relative(s)  Pt has nearly 24/7 supervision  -BR       Row Name 25 1423          Home Main Entrance    Number of Stairs, Main Entrance two  -BR     Stair Railings, Main Entrance none  -BR       Row Name 25 1423          Stairs Within Home, Primary    Number of Stairs, Within Home, Primary one  -BR       Row Name 25 1423          Cognition    Orientation Status (Cognition) oriented x 4  -BR       Row Name 25 1423          Safety Issues/Impairments Affecting Functional Mobility    Impairments Affecting Function (Mobility) balance;strength;coordination  -BR               User Key  (r) = Recorded By, (t) = Taken By, (c) = Cosigned By      Initials Name Provider Type    JP Garcias  Nicky, PT Physical Therapist                   Mobility       Row Name 06/19/25 1425          Bed Mobility    Comment, (Bed Mobility) Pt was  sitting up in recliner upon PT arrival.  -BR       Row Name 06/19/25 1425          Sit-Stand Transfer    Sit-Stand Long (Transfers) contact guard;1 person assist  -BR     Assistive Device (Sit-Stand Transfers) walker, 4-wheeled  -BR       Row Name 06/19/25 1425          Gait/Stairs (Locomotion)    Long Level (Gait) contact guard  -BR     Assistive Device (Gait) walker, 4-wheeled  -BR     Patient was able to Ambulate yes  -BR     Distance in Feet (Gait) 120  -BR     Bilateral Gait Deviations forward flexed posture;heel strike decreased  -BR     Comment, (Gait/Stairs) PT cued pt to keep the rollator closer.  -BR               User Key  (r) = Recorded By, (t) = Taken By, (c) = Cosigned By      Initials Name Provider Type    BR Nicky Garcias, PT Physical Therapist                   Obj/Interventions       Row Name 06/19/25 1429          Range of Motion Comprehensive    General Range of Motion bilateral lower extremity ROM WFL  -BR       Row Name 06/19/25 1429          Strength Comprehensive (MMT)    Comment, General Manual Muscle Testing (MMT) Assessment BLE strength grossly 3+/5  -BR       Row Name 06/19/25 1429          Balance    Balance Assessment sitting static balance;sitting dynamic balance;standing static balance  -BR     Static Sitting Balance modified independence  -BR     Dynamic Sitting Balance standby assist  -BR     Position, Sitting Balance unsupported;sitting in chair  -BR     Static Standing Balance supervision  -BR     Position/Device Used, Standing Balance supported;walker, 4-wheeled  -BR       Row Name 06/19/25 1429          Sensory Assessment (Somatosensory)    Sensory Assessment (Somatosensory) sensation intact  -BR               User Key  (r) = Recorded By, (t) = Taken By, (c) = Cosigned By      Initials Name Provider Type    BR  Nicky Garcias, PT Physical Therapist                   Goals/Plan       Row Name 06/19/25 1438          Bed Mobility Goal 1 (PT)    Activity/Assistive Device (Bed Mobility Goal 1, PT) bed mobility activities, all  -BR     [place] Level/Cues Needed (Bed Mobility Goal 1, PT) independent  -BR     Time Frame (Bed Mobility Goal 1, PT) long term goal (LTG);2 weeks  -BR       Row Name 06/19/25 1438          Transfer Goal 1 (PT)    Activity/Assistive Device (Transfer Goal 1, PT) transfers, all  -BR     [place] Level/Cues Needed (Transfer Goal 1, PT) modified independence  -BR     Time Frame (Transfer Goal 1, PT) long term goal (LTG);2 weeks  -BR       Row Name 06/19/25 1438          Gait Training Goal 1 (PT)    Activity/Assistive Device (Gait Training Goal 1, PT) gait (walking locomotion);assistive device use  -BR     [place] Level (Gait Training Goal 1, PT) modified independence  -BR     Distance (Gait Training Goal 1, PT) 250  -BR     Time Frame (Gait Training Goal 1, PT) long term goal (LTG);2 weeks  -BR       Row Name 06/19/25 1438          Therapy Assessment/Plan (PT)    Planned Therapy Interventions (PT) balance training;bed mobility training;gait training;neuromuscular re-education;transfer training;vestibular therapy;ROM (range of motion);strengthening;stair training;patient/family education  -BR               User Key  (r) = Recorded By, (t) = Taken By, (c) = Cosigned By      Initials Name Provider Type    BR Nicky Garcias, PT Physical Therapist                   Clinical Impression       Row Name 06/19/25 1429          Pain    Pretreatment Pain Rating 0/10 - no pain  -BR     Posttreatment Pain Rating 0/10 - no pain  -BR       Row Name 06/19/25 1437 06/19/25 1429       Plan of Care Review    Plan of Care Reviewed With -- patient;child  -BR    Outcome Evaluation Pura Alexander is an 84-year-old female who presents to [facility] ED on 6/18/2025 after a week progression of slurred  speech and dragging right leg. MRI brain: No acute intracranial abnormality. CT head (-). CXR (-). PMHx: hx colon CA, anxiety. Pt denies any pain or dizziness presently. Pt is speaking clearly. Pt A and O x 4. Pt resides with daughter and SAI in [facility] with 2 VALENTINA without rail and 1 interior step. Pt reports she holds on to the walls in her home and she has a rollator if needed. AROM and strength BLE equal and WNL. Pt has good static standing balance with rollator support. Pt transfers with CGA of 1 and she ambulated 120 feet with rollator with CGA of 1 with verbal cues to pt to not push the rollator too far forward. PT recommended to pt that she use her rollator at all times and pt was agreeable. PT will follow pt remotely with PT recommendation of Home with Assist and Home health PT.  -BR --      Row Name 06/19/25 1429          Therapy Assessment/Plan (PT)    Rehab Potential (PT) good  -BR     Criteria for Skilled Interventions Met (PT) yes;meets criteria;skilled treatment is necessary  -BR     Therapy Frequency (PT) 3 times/wk  -BR     Predicted Duration of Therapy Intervention (PT) until D/C  -BR       Row Name 06/19/25 1429          Vital Signs    Pre Systolic BP Rehab 154  -BR     Pre Treatment Diastolic BP 62  -BR     Pretreatment Heart Rate (beats/min) 67  -BR     Pre SpO2 (%) 98  -BR     O2 Delivery Pre Treatment room air  -BR     Pre Patient Position Sitting  -BR     Intra Patient Position Standing  -BR     Post Patient Position Sitting  -BR       Row Name 06/19/25 1420          Positioning and Restraints    Pre-Treatment Position sitting in chair/recliner  -BR     Post Treatment Position chair  -BR     In Chair notified nsg;reclined;encouraged to call for assist;call light within reach;exit alarm on;with family/caregiver  -BR               User Key  (r) = Recorded By, (t) = Taken By, (c) = Cosigned By      Initials Name Provider Type    Nicky Kimbrough, PT Physical Therapist                   Outcome  Measures       Row Name 06/19/25 1438 06/19/25 0800       How much help from another person do you currently need...    Turning from your back to your side while in flat bed without using bedrails? 4  -BR 4  -CM    Moving from lying on back to sitting on the side of a flat bed without bedrails? 4  -BR 4  -CM    Moving to and from a bed to a chair (including a wheelchair)? 3  -BR 3  -CM    Standing up from a chair using your arms (e.g., wheelchair, bedside chair)? 4  -BR 4  -CM    Climbing 3-5 steps with a railing? 3  -BR 2  -CM    To walk in hospital room? 3  -BR 3  -CM    AM-PAC 6 Clicks Score (PT) 21  -BR 20  -CM    Highest Level of Mobility Goal Walk 10 Steps or More-6  -BR Walk 10 Steps or More-6  -CM      Row Name 06/19/25 1438          Modified [place] Scale    Pre-Stroke Modified Serenity Scale 2 - Slight disability.  Unable to carry out all previous activities but able to look after own affairs without assistance.  -BR     Modified Serenity Scale 0 - No Symptoms at all.  -BR       Row Name 06/19/25 1438 06/19/25 1119       Functional Assessment    Outcome Measure Options AM-PAC 6 Clicks Basic Mobility (PT);Modified [place]  -BR AM-PAC 6 Clicks Daily Activity (OT)  -AN              User Key  (r) = Recorded By, (t) = Taken By, (c) = Cosigned By      Initials Name Provider Type    Nicky Kimbrough, PT Physical Therapist    CM Farzana Seaman, RN Registered Nurse    Robin Uribe OT Occupational Therapist                                 Physical Therapy Education       Title: PT OT SLP Therapies (Done)       Topic: Physical Therapy (Done)       Point: Mobility training (Done)       Learning Progress Summary            Patient Antonina, EDGAR,GONZALEZ, LUDA,JONE,NR by BR at 6/19/2025 1439   Family EDGAR Sarkar D, LUDA,JONE,NR by BR at 6/19/2025 1439                      Point: Body mechanics (Done)       Learning Progress Summary            Patient Antonina, EDGAR,GONZALEZ, LUDA,DU,NR by BR at 6/19/2025 1439   Family EDGAR Sarkar D, LUDA,JONE,NR by BR at  6/19/2025 1439                      Point: Precautions (Done)       Learning Progress Summary            Patient EDGAR Sarkar D, LUDA,JONE,NR by BR at 6/19/2025 1439   Family EDGAR Sarkar D, LUDA,JONE,NR by BR at 6/19/2025 1439                                      User Key       Initials Effective Dates Name Provider Type Discipline    BR 02/01/22 -  Nicky Garcias, PT Physical Therapist PT                  PT Recommendation and Plan  Planned Therapy Interventions (PT): balance training, bed mobility training, gait training, neuromuscular re-education, transfer training, vestibular therapy, ROM (range of motion), strengthening, stair training, patient/family education  Outcome Evaluation: Pura Alexander is an 84-year-old female who presents to [facility] ED on 6/18/2025 after a week progression of slurred speech and dragging right leg. MRI brain: No acute intracranial abnormality. CT head (-). CXR (-). PMHx: hx colon CA, anxiety. Pt denies any pain or dizziness presently. Pt is speaking clearly. Pt A and O x 4. Pt resides with daughter and SAI in [facility] with 2 VALENTINA without rail and 1 interior step. Pt reports she holds on to the walls in her home and she has a rollator if needed. AROM and strength BLE equal and WNL. Pt has good static standing balance with rollator support. Pt transfers with CGA of 1 and she ambulated 120 feet with rollator with CGA of 1 with verbal cues to pt to not push the rollator too far forward. PT recommended to pt that she use her rollator at all times and pt was agreeable. PT will follow pt remotely with PT recommendation of Home with Assist and Home health PT.     Time Calculation:         PT Charges       Row Name 06/19/25 1440             Time Calculation    Start Time 0938  -BR      Stop Time 0959  -BR      Time Calculation (min) 21 min  -BR      PT Received On 06/19/25  -BR      PT - Next Appointment 06/22/25  -BR      PT Goal Re-Cert Due Date 07/03/25  -BR         Time Calculation- PT     Total Timed Code Minutes- PT 0 minute(s)  -JP                User Key  (r) = Recorded By, (t) = Taken By, (c) = Cosigned By      Initials Name Provider Type    Nicky Kimbrough, PT Physical Therapist                  Therapy Charges for Today       Code Description Service Date Service Provider Modifiers Qty    40004264271 HC PT EVAL LOW COMPLEXITY 4 6/19/2025 Nicky Garcias, PT GP 1            PT G-Codes  Outcome Measure Options: AM-PAC 6 Clicks Basic Mobility (PT), Modified Serenity  AM-PAC 6 Clicks Score (PT): 21  AM-PAC 6 Clicks Score (OT): 19  Modified Serenity Scale: 0 - No Symptoms at all.  PT Discharge Summary  Anticipated Discharge Disposition (PT): home with home health, home with supervision    Nicky Garcias, YENNI  6/19/2025

## 2025-06-19 NOTE — CASE MANAGEMENT/SOCIAL WORK
Case Management Discharge Note      Final Note: Home with [facility]              Home Medical Care Coordination complete.      Service Provider Services Address Phone Fax Patient Preferred    [facility] [address] [place] [address] [phone] --                 Transportation Services  Private: Car    Final Discharge Disposition Code: 06 - home with home health care

## 2025-06-19 NOTE — PLAN OF CARE
Problem: Adult Inpatient Plan of Care  Goal: Plan of Care Review  Outcome: Progressing  Goal: Patient-Specific Goal (Individualized)  Outcome: Progressing  Goal: Absence of Hospital-Acquired Illness or Injury  Outcome: Progressing  Intervention: Identify and Manage Fall Risk  Recent Flowsheet Documentation  Taken 6/18/2025 2000 by Cindi Alexander RN  Safety Promotion/Fall Prevention:   fall prevention program maintained   safety round/check completed  Intervention: Prevent Infection  Recent Flowsheet Documentation  Taken 6/18/2025 2000 by Cindi Alexander RN  Infection Prevention: rest/sleep promoted  Goal: Optimal Comfort and Wellbeing  Outcome: Progressing  Goal: Readiness for Transition of Care  Outcome: Progressing     Problem: Stroke, Ischemic (Includes Transient Ischemic Attack)  Goal: Optimal Coping  Outcome: Progressing  Goal: Effective Bowel Elimination  Outcome: Progressing  Goal: Optimal Cerebral Tissue Perfusion  Outcome: Progressing  Goal: Optimal Cognitive Function  Outcome: Progressing  Goal: Improved Communication Skills  Outcome: Progressing  Goal: Optimal Functional Ability  Outcome: Progressing  Goal: Optimal Nutrition Intake  Outcome: Progressing  Goal: Effective Oxygenation and Ventilation  Outcome: Progressing  Goal: Improved Sensorimotor Function  Outcome: Progressing  Intervention: Optimize Sensory and Perceptual Ability  Recent Flowsheet Documentation  Taken 6/18/2025 2000 by Cindi Alexander RN  Pressure Reduction Techniques: frequent weight shift encouraged  Pressure Reduction Devices: pressure-redistributing mattress utilized  Goal: Safe and Effective Swallow  Outcome: Progressing  Goal: Effective Urinary Elimination  Outcome: Progressing     Problem: Comorbidity Management  Goal: Blood Pressure in Desired Range  Outcome: Progressing  Intervention: Maintain Blood Pressure Management  Recent Flowsheet Documentation  Taken 6/18/2025 2000 by Cindi Alexander RN  Medication  Review/Management: medications reviewed   Goal Outcome Evaluation:

## 2025-06-20 LAB — BACTERIA SPEC AEROBE CULT: NO GROWTH

## 2025-07-07 RX ORDER — HYDROXYZINE HYDROCHLORIDE 10 MG/1
10 TABLET, FILM COATED ORAL 3 TIMES DAILY PRN
Qty: 40 TABLET | Refills: 0 | Status: CANCELLED | OUTPATIENT
Start: 2025-07-07

## 2025-07-17 LAB
CV ZIO BASELINE AVG BPM: 65 BPM
CV ZIO BASELINE BPM HIGH: 197 BPM
CV ZIO BASELINE BPM LOW: 51 BPM
CV ZIO DEVICE ANALYSIS TIME: NORMAL
CV ZIO ECT SVE COUNT: 1436 EPISODES
CV ZIO ECT SVE CPLT COUNT: 74 EPISODES
CV ZIO ECT SVE CPLT FREQ: NORMAL
CV ZIO ECT SVE FREQ: NORMAL
CV ZIO ECT SVE TPLT COUNT: 29 EPISODES
CV ZIO ECT SVE TPLT FREQ: NORMAL
CV ZIO ECT VE COUNT: 80 EPISODES
CV ZIO ECT VE CPLT COUNT: 2 EPISODES
CV ZIO ECT VE CPLT FREQ: NORMAL
CV ZIO ECT VE FREQ: NORMAL
CV ZIO ECT VE TPLT COUNT: 1 EPISODES
CV ZIO ECT VE TPLT FREQ: NORMAL
CV ZIO ECTOPIC SVE COUPLET RAW PERCENT: 0.01 %
CV ZIO ECTOPIC SVE ISOLATED PERCENT: 0.13 %
CV ZIO ECTOPIC SVE TRIPLET RAW PERCENT: 0.01 %
CV ZIO ECTOPIC VE COUPLET RAW PERCENT: 0 %
CV ZIO ECTOPIC VE ISOLATED PERCENT: 0.01 %
CV ZIO ECTOPIC VE TRIPLET RAW PERCENT: 0 %
CV ZIO ENROLLMENT END: NORMAL
CV ZIO ENROLLMENT START: NORMAL
CV ZIO PATIENT EVENTS DIARIES: 4
CV ZIO PATIENT EVENTS TRIGGERS: 29
CV ZIO PAUSE COUNT: 0
CV ZIO PRESCRIPTION STATUS: NORMAL
CV ZIO SVT AVG BPM: 127 BPM
CV ZIO SVT BPM HIGH: 162 BPM
CV ZIO SVT BPM LOW: 79 BPM
CV ZIO SVT COUNT: 33
CV ZIO SVT F EPI AVG BPM: 157 BPM
CV ZIO SVT F EPI BEATS: 4 BEATS
CV ZIO SVT F EPI BPM HIGH: 162 BPM
CV ZIO SVT F EPI BPM LOW: 154 BPM
CV ZIO SVT F EPI DUR: 1.3 SEC
CV ZIO SVT F EPI END: NORMAL
CV ZIO SVT F EPI START: NORMAL
CV ZIO SVT L EPI AVG BPM: 143 BPM
CV ZIO SVT L EPI BEATS: 17 BEATS
CV ZIO SVT L EPI BPM HIGH: 152 BPM
CV ZIO SVT L EPI BPM LOW: 132 BPM
CV ZIO SVT L EPI DUR: 7.4 SEC
CV ZIO SVT L EPI END: NORMAL
CV ZIO SVT L EPI START: NORMAL
CV ZIO TOTAL  ENROLLMENT PERIOD: NORMAL
CV ZIO VT AVG BPM: 175 BPM
CV ZIO VT BPM HIGH: 197 BPM
CV ZIO VT BPM LOW: 138 BPM
CV ZIO VT COUNT: 1
CV ZIO VT F EPI AVG BPM: 175
CV ZIO VT F EPI BEATS: 6 BEATS
CV ZIO VT F EPI BPM HIGH: 197
CV ZIO VT F EPI BPM LOW: 138
CV ZIO VT F EPI DUR: 2.6 SEC
CV ZIO VT F EPI END: NORMAL
CV ZIO VT F EPI START: NORMAL
CV ZIO VT L EPI AVG BPM: 175
CV ZIO VT L EPI BEATS: 6 BEATS
CV ZIO VT L EPI BPM HIGH: 197 BPM
CV ZIO VT L EPI BPM LOW: 138 BPM
CV ZIO VT L EPI DUR: 2.6
CV ZIO VT L EPI END: NORMAL
CV ZIO VT L EPI START: NORMAL